# Patient Record
Sex: MALE | Race: WHITE | NOT HISPANIC OR LATINO | URBAN - METROPOLITAN AREA
[De-identification: names, ages, dates, MRNs, and addresses within clinical notes are randomized per-mention and may not be internally consistent; named-entity substitution may affect disease eponyms.]

---

## 2021-07-21 ENCOUNTER — HISTORICAL (OUTPATIENT)
Dept: ADMINISTRATIVE | Facility: HOSPITAL | Age: 21
End: 2021-07-21

## 2021-07-21 LAB
ABS NEUT (OLG): 4.9 X10(3)/MCL (ref 2.1–9.2)
ALBUMIN SERPL-MCNC: 4.6 GM/DL (ref 3.4–5)
ALBUMIN/GLOB SERPL: 1.7 {RATIO} (ref 1.5–2.5)
ALP SERPL-CCNC: 90 UNIT/L (ref 38–126)
ALT SERPL-CCNC: 76 UNIT/L (ref 7–52)
APPEARANCE, UA: CLEAR
AST SERPL-CCNC: 46 UNIT/L (ref 15–37)
BACTERIA #/AREA URNS AUTO: NORMAL /HPF
BILIRUB SERPL-MCNC: 0.5 MG/DL (ref 0.2–1)
BILIRUB UR QL STRIP: NEGATIVE MG/DL
BILIRUBIN DIRECT+TOT PNL SERPL-MCNC: 0.1 MG/DL (ref 0–0.5)
BILIRUBIN DIRECT+TOT PNL SERPL-MCNC: 0.4 MG/DL
BUN SERPL-MCNC: 10 MG/DL (ref 7–18)
CALCIUM SERPL-MCNC: 9.5 MG/DL (ref 8.5–10.1)
CHLORIDE SERPL-SCNC: 101 MMOL/L (ref 98–107)
CHOLEST SERPL-MCNC: 288 MG/DL (ref 0–200)
CHOLEST/HDLC SERPL: 7 {RATIO}
CO2 SERPL-SCNC: 28 MMOL/L (ref 21–32)
COLOR UR: YELLOW
CREAT SERPL-MCNC: 0.75 MG/DL (ref 0.6–1.3)
ERYTHROCYTE [DISTWIDTH] IN BLOOD BY AUTOMATED COUNT: 12.9 % (ref 11.5–17)
GLOBULIN SER-MCNC: 2.7 GM/DL (ref 1.2–3)
GLUCOSE (UA): NEGATIVE MG/DL
GLUCOSE SERPL-MCNC: 120 MG/DL (ref 74–106)
HCT VFR BLD AUTO: 50.3 % (ref 42–52)
HDLC SERPL-MCNC: 41 MG/DL (ref 35–60)
HGB BLD-MCNC: 16.5 GM/DL (ref 14–18)
HGB UR QL STRIP: NEGATIVE UNIT/L
KETONES UR QL STRIP: NEGATIVE MG/DL
LDLC SERPL CALC-MCNC: 212 MG/DL (ref 0–129)
LEUKOCYTE ESTERASE UR QL STRIP: NEGATIVE UNIT/L
LYMPHOCYTES # BLD AUTO: 3.3 X10(3)/MCL (ref 0.6–3.4)
LYMPHOCYTES NFR BLD AUTO: 36.7 % (ref 13–40)
MCH RBC QN AUTO: 29.5 PG (ref 27–31.2)
MCHC RBC AUTO-ENTMCNC: 33 GM/DL (ref 32–36)
MCV RBC AUTO: 90 FL (ref 80–94)
MONOCYTES # BLD AUTO: 0.8 X10(3)/MCL (ref 0.1–1.3)
MONOCYTES NFR BLD AUTO: 9.1 % (ref 0.1–24)
NEUTROPHILS NFR BLD AUTO: 54.2 % (ref 47–80)
NITRITE UR QL STRIP.AUTO: NEGATIVE
PH UR STRIP: 6 [PH]
PLATELET # BLD AUTO: 273 X10(3)/MCL (ref 130–400)
PMV BLD AUTO: 9.4 FL (ref 9.4–12.4)
POTASSIUM SERPL-SCNC: 4.3 MMOL/L (ref 3.5–5.1)
PROT SERPL-MCNC: 7.3 GM/DL (ref 6.4–8.2)
PROT UR QL STRIP: NEGATIVE MG/DL
RBC # BLD AUTO: 5.6 X10(6)/MCL (ref 4.7–6.1)
RBC #/AREA URNS HPF: NORMAL /HPF
SODIUM SERPL-SCNC: 141 MMOL/L (ref 136–145)
SP GR UR STRIP: 1.02
SQUAMOUS EPITHELIAL, UA: NORMAL /LPF
TRIGL SERPL-MCNC: 248 MG/DL (ref 30–150)
TSH SERPL-ACNC: 3.19 MIU/ML (ref 0.35–4.94)
UROBILINOGEN UR STRIP-ACNC: 0.2 MG/DL
VLDLC SERPL CALC-MCNC: 49.6 MG/DL
WBC # SPEC AUTO: 9 X10(3)/MCL (ref 4.5–11.5)
WBC #/AREA URNS AUTO: NORMAL /[HPF]

## 2021-07-26 LAB
EST. AVERAGE GLUCOSE BLD GHB EST-MCNC: 128 MG/DL
HBA1C MFR BLD: 6.1 % (ref 4.4–6.4)

## 2022-01-02 LAB
RAPID GROUP A STREP (OHS): NEGATIVE
SARS-COV-2 RNA RESP QL NAA+PROBE: NEGATIVE

## 2022-04-10 ENCOUNTER — HISTORICAL (OUTPATIENT)
Dept: ADMINISTRATIVE | Facility: HOSPITAL | Age: 22
End: 2022-04-10
Payer: COMMERCIAL

## 2022-04-25 ENCOUNTER — OFFICE VISIT (OUTPATIENT)
Dept: DERMATOLOGY | Facility: CLINIC | Age: 22
End: 2022-04-25
Payer: COMMERCIAL

## 2022-04-25 ENCOUNTER — PATIENT MESSAGE (OUTPATIENT)
Dept: DERMATOLOGY | Facility: CLINIC | Age: 22
End: 2022-04-25

## 2022-04-25 DIAGNOSIS — L08.9 SUPERFICIAL SKIN INFECTION: Primary | ICD-10-CM

## 2022-04-25 PROCEDURE — 99204 OFFICE O/P NEW MOD 45 MIN: CPT | Mod: 95,,, | Performed by: DERMATOLOGY

## 2022-04-25 PROCEDURE — 99204 PR OFFICE/OUTPT VISIT, NEW, LEVL IV, 45-59 MIN: ICD-10-PCS | Mod: 95,,, | Performed by: DERMATOLOGY

## 2022-04-25 RX ORDER — MUPIROCIN 20 MG/G
OINTMENT TOPICAL 2 TIMES DAILY
Qty: 30 G | Refills: 11 | Status: SHIPPED | OUTPATIENT
Start: 2022-04-25 | End: 2022-07-05 | Stop reason: SDUPTHER

## 2022-04-25 RX ORDER — RIFAMPIN 300 MG/1
300 CAPSULE ORAL EVERY 12 HOURS
Qty: 60 CAPSULE | Refills: 2 | Status: SHIPPED | OUTPATIENT
Start: 2022-04-25 | End: 2022-07-05 | Stop reason: SDUPTHER

## 2022-04-25 RX ORDER — DOXYCYCLINE 100 MG/1
TABLET ORAL
Qty: 60 TABLET | Refills: 2 | Status: SHIPPED | OUTPATIENT
Start: 2022-04-25 | End: 2022-06-09

## 2022-04-25 NOTE — PATIENT INSTRUCTIONS
Discussed benefits and risks of doxycyline therapy including but not limited to GI discomfort, esophageal irritation/ulceration, and increased sun sensitivity. Patient was counseled to take medicine with meals and at least 1 hour before lying down.     Counseled patient that rifampin may turn bodily fluids red-orange including urine, saliva, nasal discharge, tears. It may turn contacts red yellow it may also cause birth control to be ineffective,  advised patient to use backup form contraception if necessary    Start muprocin BID to lesions of concern, once done with antibiotics start twice daily to nares (take cotton swab and push around nares0    Start bleach baths:  Take 1/3 cup of bleach to a 2 inch tub of water let patient sit and play for 15 minutes 2-3 x a week.     If lesions spread, are unbearbly painful or if you have fever please go to the ER to seek urgent care as there may be a need for IV antibiotics    If lesions are not improving or there are more within this next month, patient is to message me and we will see him sooner than 3 months, otherwise patient is to take 3 month course of therapy until lesions are clear (whichever happens sooner)    F/u 3 mo

## 2022-04-25 NOTE — PROGRESS NOTES
Subjective:       Patient ID:  Anthony Lares is a 22 y.o. male who presents for   Chief Complaint   Patient presents with    Infection     History of Present Illness: The patient presents with chief complaint of  lesions.  Location: everywhere  Duration: 5 months  Signs/Symptoms: scabs that welp up    Prior treatments: abx x 10 days without relief      Review of Systems   HENT: Negative for nosebleeds and headaches.    Gastrointestinal: Negative for diarrhea and Sensitivity to oral antibiotics.   Musculoskeletal: Negative for arthralgias.   Skin: Positive for activity-related sunscreen use. Negative for daily sunscreen use and recent sunburn.   Neurological: Negative for headaches.   Psychiatric/Behavioral: Negative for depressed mood.        Objective:    Physical Exam   Constitutional: He appears well-developed and well-nourished. No distress.   Neurological: He is alert and oriented to person, place, and time. He is not disoriented.   Psychiatric: He has a normal mood and affect.   Skin:   Areas Examined (abnormalities noted in diagram):   Back Inspection Performed              Diagram Legend     Erythematous scaling macule/papule c/w actinic keratosis       Vascular papule c/w angioma      Pigmented verrucoid papule/plaque c/w seborrheic keratosis      Yellow umbilicated papule c/w sebaceous hyperplasia      Irregularly shaped tan macule c/w lentigo     1-2 mm smooth white papules consistent with Milia      Movable subcutaneous cyst with punctum c/w epidermal inclusion cyst      Subcutaneous movable cyst c/w pilar cyst      Firm pink to brown papule c/w dermatofibroma      Pedunculated fleshy papule(s) c/w skin tag(s)      Evenly pigmented macule c/w junctional nevus     Mildly variegated pigmented, slightly irregular-bordered macule c/w mildly atypical nevus      Flesh colored to evenly pigmented papule c/w intradermal nevus       Pink pearly papule/plaque c/w basal cell carcinoma      Erythematous  hyperkeratotic cursted plaque c/w SCC      Surgical scar with no sign of skin cancer recurrence      Open and closed comedones      Inflammatory papules and pustules      Verrucoid papule consistent consistent with wart     Erythematous eczematous patches and plaques     Dystrophic onycholytic nail with subungual debris c/w onychomycosis     Umbilicated papule    Erythematous-base heme-crusted tan verrucoid plaque consistent with inflamed seborrheic keratosis     Erythematous Silvery Scaling Plaque c/w Psoriasis     See annotation      Assessment / Plan:        Superficial skin infection  -     rifAMpin (RIFADIN) 300 MG capsule; Take 1 capsule (300 mg total) by mouth every 12 (twelve) hours.  Dispense: 60 capsule; Refill: 2  -     doxycycline monohydrate 100 mg Tab; Take 2 po qday  Dispense: 60 tablet; Refill: 2  -     mupirocin (BACTROBAN) 2 % ointment; Apply topically 2 (two) times daily. Once done with month supply of antibiotics  Dispense: 30 g; Refill: 11    Discussed benefits and risks of doxycyline therapy including but not limited to GI discomfort, esophageal irritation/ulceration, and increased sun sensitivity. Patient was counseled to take medicine with meals and at least 1 hour before lying down.     Counseled patient that rifampin may turn bodily fluids red-orange including urine, saliva, nasal discharge, tears. It may turn contacts red yellow it may also cause birth control to be ineffective,  advised patient to use backup form contraception if necessary    Start muprocin BID to lesions of concern, once done with antibiotics start twice daily to nares (take cotton swab and push around nares0    Start bleach baths:  Take 1/3 cup of bleach to a 2 inch tub of water let patient sit and play for 15 minutes 2-3 x a week.     If lesions spread, are unbearbly painful or if you have fever please go to the ER to seek urgent care as there may be a need for IV antibiotics    If lesions are not improving or there  are more within this next month, patient is to message me and we will see him sooner than 3 months, otherwise patient is to take 3 month course of therapy until lesions are clear (whichever happens sooner)    F/u 3 mo      The patient location is: Home in [place]  The chief complaint leading to consultation is: lesions  Visit type: audiovisual    Face to Face time with patient: 20  25 minutes of total time spent on the encounter, which includes face to face time and non-face to face time preparing to see the patient (eg, review of tests), Obtaining and/or reviewing separately obtained history, Documenting clinical information in the electronic or other health record, Independently interpreting results (not separately reported) and communicating results to the patient/family/caregiver, or Care coordination (not separately reported).         Each patient to whom he or she provides medical services by telemedicine is:  (1) informed of the relationship between the physician and patient and the respective role of any other health care provider with respect to management of the patient; and (2) notified that he or she may decline to receive medical services by telemedicine and may withdraw from such care at any time.               No follow-ups on file.

## 2022-04-28 VITALS
DIASTOLIC BLOOD PRESSURE: 92 MMHG | OXYGEN SATURATION: 99 % | BODY MASS INDEX: 33.34 KG/M2 | SYSTOLIC BLOOD PRESSURE: 133 MMHG | WEIGHT: 238.13 LBS | HEIGHT: 71 IN

## 2022-05-03 ENCOUNTER — PATIENT MESSAGE (OUTPATIENT)
Dept: DERMATOLOGY | Facility: CLINIC | Age: 22
End: 2022-05-03
Payer: COMMERCIAL

## 2022-05-04 DIAGNOSIS — Z79.899 ENCOUNTER FOR LONG-TERM (CURRENT) USE OF OTHER MEDICATIONS: Primary | ICD-10-CM

## 2022-05-04 RX ORDER — CLINDAMYCIN HYDROCHLORIDE 150 MG/1
300 CAPSULE ORAL EVERY 12 HOURS
Qty: 120 CAPSULE | Refills: 2 | Status: SHIPPED | OUTPATIENT
Start: 2022-05-04 | End: 2022-07-05 | Stop reason: SDUPTHER

## 2022-05-04 NOTE — TELEPHONE ENCOUNTER
----- Message from Carine Burton RN sent at 5/4/2022  3:48 PM CDT -----  Regarding: FW: Pr inquiry    ----- Message -----  From: Avtar Spence MA  Sent: 5/3/2022   4:46 PM CDT  To: Carine Burton RN  Subject: FW: Pr inquiry                                     ----- Message -----  From: Dilia Fung  Sent: 5/3/2022   4:34 PM CDT  To: [facility] Staff  Subject: Pr inquiry                                       Pt  calling speak with staff regarding medication  [phone] (home)

## 2022-05-09 ENCOUNTER — OFFICE VISIT (OUTPATIENT)
Dept: PSYCHIATRY | Facility: CLINIC | Age: 22
End: 2022-05-09
Payer: COMMERCIAL

## 2022-05-09 ENCOUNTER — PATIENT MESSAGE (OUTPATIENT)
Dept: DERMATOLOGY | Facility: CLINIC | Age: 22
End: 2022-05-09
Payer: COMMERCIAL

## 2022-05-09 DIAGNOSIS — F33.2 SEVERE EPISODE OF RECURRENT MAJOR DEPRESSIVE DISORDER, WITHOUT PSYCHOTIC FEATURES: Primary | ICD-10-CM

## 2022-05-09 DIAGNOSIS — F41.1 GAD (GENERALIZED ANXIETY DISORDER): ICD-10-CM

## 2022-05-09 PROCEDURE — 90791 PSYCH DIAGNOSTIC EVALUATION: CPT | Mod: 95,,, | Performed by: PSYCHOLOGIST

## 2022-05-09 PROCEDURE — 90791 PR PSYCHIATRIC DIAGNOSTIC EVALUATION: ICD-10-PCS | Mod: 95,,, | Performed by: PSYCHOLOGIST

## 2022-05-09 NOTE — PROGRESS NOTES
"Psychiatry Initial Visit (PhD/LCSW)  Diagnostic Interview - CPT 51791    Date: 5/9/2022    Site: Telemed     The patient location is: Patient's home/ Patient reported that his location at the time of this visit was in the [facility]     Visit type: Virtual visit with synchronous audio and video     Each patient to whom he or she provides medical services by telemedicine is: (1) informed of the relationship between the physician and patient and the respective role of any other health care provider with respect to management of the patient; and (2) notified that he or she may decline to receive medical services by telemedicine and may withdraw from such care at any time.    Referral source: self    Clinical status of patient: Outpatient    Anthony Lares, a 22 y.o. male, for initial evaluation visit.  Met with patient.    Chief complaint/reason for encounter: depression and anxiety    History of present illness: Patient reported long history of anxiety and depression since adolescence.  Symptoms include feeling overwhelmed, depressed mood, diminished interest, insomnia, fatigue, worthlessness/guilt, poor concentration, thoughts of death, tearfulness, social isolation, decreased memory, excessive anxiety/worry, restlessness/keyed up, irritability, muscle tension and panic attacks.  Patient noted that there has been times where he has been depressed to the point that he does not want to get out of bed.  Patient reported recent stressors including the end of his four year relationship and relocation to [place] in August 2021.  Patient reported transient thoughts of suicide or "nonexistence," but he denied plan or intent.  Patient denied access to a firearm.  Patient denied history of suicide attempts and self-harm behaviors.  Patient denied current suicidal and homicidal ideations.     Pain: noncontributory    Symptoms:   · Mood: depressed mood, diminished interest, insomnia, fatigue, worthlessness/guilt, " "poor concentration, thoughts of death, tearfulness and social isolation  · Anxiety: decreased memory, excessive anxiety/worry, restlessness/keyed up, irritability, muscle tension and panic attacks  · Substance abuse: denied  · Cognitive functioning: denied  · Health behaviors: noncontributory    Psychiatric history: has participated in counseling/psychotherapy on an outpatient basis in the past - three times - high school, twice in [facility] - via [facility] - "did not find very helpful due to forced use of CBT by residents in training"     Medical history: noncontributory - elevated cholesterol, prolonged staph infection    Family history of psychiatric illness: Mother - anxiety and depression, Sister - depression    Social history (marriage, employment, etc.): Patient reported growing up in [place], [place] living with his parents and sisters.  He is the oldest of three children.  He reports having good relationships with his younger twin sisters.  He stated that he tends to have a "parent child relationship" with his sisters.  He noted that his mother was recovering from surgery for two years when he was in high school so he had to help with caring for his sisters.  Patient reports having close relationships with both of his parents.  Patient denied history of abuse or trauma during childhood.  He graduated from high school.  His highest level of education is three Bachelor's degrees in Political Science, Economics, and Math.  He is currently in a Ph.D. program at [employer] majoring in Economics.  He is a full time student and also works as a .  He has never been  and has no children.  He reported that his last relationship of four years ended nine months ago "because their lives were headed in different directions" and they were relocating to different places.        Substance use:   Alcohol: occasional   Drugs: Marijuana - "a few hits" nightly, Psychedelics - abstinent for " about six months   Tobacco: none   Caffeine: Sodas - occasional    Current medications and drug reactions (include OTC, herbal): see medication list     Strengths and liabilities: Strength: Patient is expressive/articulate., Liability: Patient lacks coping skills.    Current Evaluation:     Mental Status Exam:  General Appearance:  unremarkable, age appropriate   Speech: normal tone, normal rate, normal pitch, normal volume      Level of Cooperation: cooperative      Thought Processes: normal and logical   Mood: anxious, depressed      Thought Content: normal, no suicidality, no homicidality, delusions, or paranoia   Affect: congruent and appropriate   Orientation: Oriented x3   Memory: recent >  words after brief delay: 3 of 3 words, remote >  intact   Attention Span & Concentration: completed serial 7s adequately   Fund of General Knowledge: intact and appropriate to age and level of education   Judgment & Insight: fair     Language  intact     Diagnostic Impression - Plan:       ICD-10-CM ICD-9-CM   1. Severe episode of recurrent major depressive disorder, without psychotic features  F33.2 296.33   2. STEPHANE (generalized anxiety disorder)  F41.1 300.02       Plan:individual psychotherapy and consult psychiatrist for medication evaluation    Return to Clinic: 2 weeks    Length of Service (minutes): 45

## 2022-05-17 ENCOUNTER — OFFICE VISIT (OUTPATIENT)
Dept: PSYCHIATRY | Facility: CLINIC | Age: 22
End: 2022-05-17
Payer: COMMERCIAL

## 2022-05-17 DIAGNOSIS — F33.2 SEVERE EPISODE OF RECURRENT MAJOR DEPRESSIVE DISORDER, WITHOUT PSYCHOTIC FEATURES: Primary | ICD-10-CM

## 2022-05-17 DIAGNOSIS — F41.1 GAD (GENERALIZED ANXIETY DISORDER): ICD-10-CM

## 2022-05-17 PROCEDURE — 90837 PSYTX W PT 60 MINUTES: CPT | Mod: 95,,, | Performed by: PSYCHOLOGIST

## 2022-05-17 PROCEDURE — 90837 PR PSYCHOTHERAPY W/PATIENT, 60 MIN: ICD-10-PCS | Mod: 95,,, | Performed by: PSYCHOLOGIST

## 2022-05-17 NOTE — PROGRESS NOTES
Individual Psychotherapy (PhD/LCSW)    5/17/2022    Therapeutic Intervention: Met with patient.  Outpatient - Behavior modifying psychotherapy 60 min - CPT code 42808    The patient location is: Patient's home/ Patient reported that his location at the time of this visit was in the [facility]     Visit type: Virtual visit with synchronous audio and video     Each patient to whom he or she provides medical services by telemedicine is: (1) informed of the relationship between the physician and patient and the respective role of any other health care provider with respect to management of the patient; and (2) notified that he or she may decline to receive medical services by telemedicine and may withdraw from such care at any time.    Chief complaint/reason for encounter: depression and anxiety     Interval history and content of current session: Patient presented casually dressed, alert, and oriented.  Patient reported experiencing feeling overwhelmed, depressed mood, diminished interest, insomnia, fatigue, worthlessness/guilt, poor concentration, thoughts of death, tearfulness, social isolation, decreased memory, excessive anxiety/worry, restlessness/keyed up, irritability, muscle tension and panic attacks.  Patient reported transient thoughts of suicide in the past, but he denied plan or intent.  Patient denied current suicidal and homicidal ideations.  Discussed a safety plan with patient when he is experiencing suicidal or self harm thoughts, including patient going to his local emergency room, calling 911, or contacting the National Suicide Prevention hotline (1-744.927.5791).  Explored source of patient's depressed mood.  Patient discussed that he tends to become so depressed that he has little energy to do activities of daily life which makes him feel even more depressed.  Active listening skills were used as patient described recent stressors including school, limited local social support, ending of a  friendship, and concerns about COVID-19.  Patient was taught behavioral coping strategies such as physical exercise, increased social involvement, sharing of feelings, setting boundaries, and increased assertiveness as ways to reduce feelings of anxiety and depression.                             Treatment plan:  · Target symptoms: depression, anxiety   · Why chosen therapy is appropriate versus another modality: relevant to diagnosis  · Outcome monitoring methods: self-report  · Therapeutic intervention type: insight oriented psychotherapy, behavior modifying psychotherapy    Risk parameters:  Patient reports no suicidal ideation  Patient reports no homicidal ideation  Patient reports no self-injurious behavior  Patient reports no violent behavior    Verbal deficits: None    Patient's response to intervention:  The patient's response to intervention is accepting.    Progress toward goals and other mental status changes:  The patient's progress toward goals is fair .    Diagnosis:     ICD-10-CM ICD-9-CM   1. Severe episode of recurrent major depressive disorder, without psychotic features  F33.2 296.33   2. STEPHANE (generalized anxiety disorder)  F41.1 300.02       Plan:  individual psychotherapy and consult psychiatrist for medication evaluation - wants to wait on medication until after his prelims are completed.    Return to clinic: 2 weeks    Length of Service (minutes): 60

## 2022-05-23 ENCOUNTER — OFFICE VISIT (OUTPATIENT)
Dept: PSYCHIATRY | Facility: CLINIC | Age: 22
End: 2022-05-23
Payer: COMMERCIAL

## 2022-05-23 DIAGNOSIS — F41.1 GAD (GENERALIZED ANXIETY DISORDER): ICD-10-CM

## 2022-05-23 DIAGNOSIS — F33.2 SEVERE EPISODE OF RECURRENT MAJOR DEPRESSIVE DISORDER, WITHOUT PSYCHOTIC FEATURES: Primary | ICD-10-CM

## 2022-05-23 PROCEDURE — 90837 PSYTX W PT 60 MINUTES: CPT | Mod: 95,,, | Performed by: PSYCHOLOGIST

## 2022-05-23 PROCEDURE — 90837 PR PSYCHOTHERAPY W/PATIENT, 60 MIN: ICD-10-PCS | Mod: 95,,, | Performed by: PSYCHOLOGIST

## 2022-05-23 NOTE — PROGRESS NOTES
Individual Psychotherapy (PhD/LCSW)    5/23/2022    Therapeutic Intervention: Met with patient.  Outpatient - Behavior modifying psychotherapy 60 min - CPT code 22351    The patient location is: Patient's home/ Patient reported that his location at the time of this visit was in the [facility]     Visit type: Virtual visit with synchronous audio and video     Each patient to whom he or she provides medical services by telemedicine is: (1) informed of the relationship between the physician and patient and the respective role of any other health care provider with respect to management of the patient; and (2) notified that he or she may decline to receive medical services by telemedicine and may withdraw from such care at any time.    Chief complaint/reason for encounter: depression and anxiety     Interval history and content of current session: Patient presented casually dressed, alert, and oriented.  Patient reported experiencing feeling overwhelmed, depressed mood, diminished interest, insomnia, fatigue, worthlessness/guilt, poor concentration, thoughts of death, tearfulness, social isolation, decreased memory, excessive anxiety/worry, restlessness/keyed up, irritability, muscle tension and panic attacks.  Patient denied current suicidal and homicidal ideations.  Explored source of patient's depressed mood.  Patient discussed his difficulty establishing a local social support system given his COVID concerns.  Explored ways for patient to make friends locally.  Active listening skills were used as patient described feeling unmotivated and exhibiting poor executive functioning skills in light of his upcoming preliminary examinations.  Patient was taught behavioral coping strategies such as physical exercise, increased social involvement, relaxation skills, and activity scheduling as ways to reduce feelings of anxiety and depression.                         Treatment plan:  · Target symptoms: depression,  anxiety   · Why chosen therapy is appropriate versus another modality: relevant to diagnosis  · Outcome monitoring methods: self-report  · Therapeutic intervention type: insight oriented psychotherapy, behavior modifying psychotherapy    Risk parameters:  Patient reports no suicidal ideation  Patient reports no homicidal ideation  Patient reports no self-injurious behavior  Patient reports no violent behavior    Verbal deficits: None    Patient's response to intervention:  The patient's response to intervention is accepting.    Progress toward goals and other mental status changes:  The patient's progress toward goals is fair .    Diagnosis:     ICD-10-CM ICD-9-CM   1. Severe episode of recurrent major depressive disorder, without psychotic features  F33.2 296.33   2. STEPHANE (generalized anxiety disorder)  F41.1 300.02       Plan:  individual psychotherapy and consult psychiatrist for medication evaluation - wants to wait on medication until after his prelims are completed.    Return to clinic: 2 weeks    Length of Service (minutes): 60

## 2022-05-30 ENCOUNTER — PATIENT MESSAGE (OUTPATIENT)
Dept: DERMATOLOGY | Facility: CLINIC | Age: 22
End: 2022-05-30
Payer: COMMERCIAL

## 2022-06-02 ENCOUNTER — OFFICE VISIT (OUTPATIENT)
Dept: PSYCHIATRY | Facility: CLINIC | Age: 22
End: 2022-06-02
Payer: COMMERCIAL

## 2022-06-02 DIAGNOSIS — F33.2 SEVERE EPISODE OF RECURRENT MAJOR DEPRESSIVE DISORDER, WITHOUT PSYCHOTIC FEATURES: Primary | ICD-10-CM

## 2022-06-02 DIAGNOSIS — F41.1 GAD (GENERALIZED ANXIETY DISORDER): ICD-10-CM

## 2022-06-02 PROCEDURE — 90837 PR PSYCHOTHERAPY W/PATIENT, 60 MIN: ICD-10-PCS | Mod: 95,,, | Performed by: PSYCHOLOGIST

## 2022-06-02 PROCEDURE — 90837 PSYTX W PT 60 MINUTES: CPT | Mod: 95,,, | Performed by: PSYCHOLOGIST

## 2022-06-02 NOTE — PROGRESS NOTES
Individual Psychotherapy (PhD/LCSW)    6/2/2022    Therapeutic Intervention: Met with patient.  Outpatient - Behavior modifying psychotherapy 60 min - CPT code 28314    The patient location is: Patient's home/ Patient reported that his location at the time of this visit was in the [facility]     Visit type: Virtual visit with synchronous audio and video     Each patient to whom he or she provides medical services by telemedicine is: (1) informed of the relationship between the physician and patient and the respective role of any other health care provider with respect to management of the patient; and (2) notified that he or she may decline to receive medical services by telemedicine and may withdraw from such care at any time.    Chief complaint/reason for encounter: depression and anxiety     Interval history and content of current session: Patient presented casually dressed, alert, and oriented.  Patient reported experiencing feeling overwhelmed, depressed mood, diminished interest, insomnia, fatigue, worthlessness/guilt, poor concentration, thoughts of death, tearfulness, social isolation, decreased memory, excessive anxiety/worry, restlessness/keyed up, irritability, muscle tension and panic attacks.  Patient denied current suicidal and homicidal ideations.  Explored source of patient's depressed mood.  Patient discussed his difficulty establishing a local social support system as he does not relate to his classmates.  Active listening skills were used as patient described experiencing diminished interest in activities that he previously enjoyed including his tendency to want to care for others.  Patient exhibited good insight when he stated that he tends to keep himself busy to avoid feeling his emotions.  Discussed patient participating in the [facility] clinic to address his depressed mood and patient agreed to the program.                    Treatment plan:  · Target symptoms: depression, anxiety   · Why  chosen therapy is appropriate versus another modality: relevant to diagnosis  · Outcome monitoring methods: self-report  · Therapeutic intervention type: insight oriented psychotherapy, behavior modifying psychotherapy    Risk parameters:  Patient reports no suicidal ideation  Patient reports no homicidal ideation  Patient reports no self-injurious behavior  Patient reports no violent behavior    Verbal deficits: None    Patient's response to intervention:  The patient's response to intervention is accepting.    Progress toward goals and other mental status changes:  The patient's progress toward goals is fair .    Diagnosis:     ICD-10-CM ICD-9-CM   1. Severe episode of recurrent major depressive disorder, without psychotic features  F33.2 296.33   2. STEPHANE (generalized anxiety disorder)  F41.1 300.02       Plan:  individual psychotherapy and consult psychiatrist for medication evaluation - wants to wait on medication until after his prelims are completed.    Return to clinic: 2 weeks    Length of Service (minutes): 60

## 2022-06-06 ENCOUNTER — PATIENT MESSAGE (OUTPATIENT)
Dept: PSYCHIATRY | Facility: CLINIC | Age: 22
End: 2022-06-06
Payer: COMMERCIAL

## 2022-06-07 ENCOUNTER — PATIENT MESSAGE (OUTPATIENT)
Dept: PSYCHIATRY | Facility: CLINIC | Age: 22
End: 2022-06-07
Payer: COMMERCIAL

## 2022-06-07 ENCOUNTER — OFFICE VISIT (OUTPATIENT)
Dept: PSYCHIATRY | Facility: CLINIC | Age: 22
End: 2022-06-07
Payer: COMMERCIAL

## 2022-06-07 DIAGNOSIS — F41.1 GAD (GENERALIZED ANXIETY DISORDER): ICD-10-CM

## 2022-06-07 DIAGNOSIS — F33.2 SEVERE EPISODE OF RECURRENT MAJOR DEPRESSIVE DISORDER, WITHOUT PSYCHOTIC FEATURES: Primary | ICD-10-CM

## 2022-06-07 PROCEDURE — 90837 PSYTX W PT 60 MINUTES: CPT | Mod: 95,,, | Performed by: PSYCHOLOGIST

## 2022-06-07 PROCEDURE — 90837 PR PSYCHOTHERAPY W/PATIENT, 60 MIN: ICD-10-PCS | Mod: 95,,, | Performed by: PSYCHOLOGIST

## 2022-06-07 NOTE — PROGRESS NOTES
Individual Psychotherapy (PhD/LCSW)    6/7/2022    Therapeutic Intervention: Met with patient.  Outpatient - Behavior modifying psychotherapy 60 min - CPT code 72613    The patient location is: Patient's home/ Patient reported that his location at the time of this visit was in the [facility]     Visit type: Virtual visit with synchronous audio and video     Each patient to whom he or she provides medical services by telemedicine is: (1) informed of the relationship between the physician and patient and the respective role of any other health care provider with respect to management of the patient; and (2) notified that he or she may decline to receive medical services by telemedicine and may withdraw from such care at any time.    Chief complaint/reason for encounter: depression and anxiety     Interval history and content of current session: Patient presented casually dressed, alert, and oriented.  Patient reported experiencing feeling overwhelmed, depressed mood, diminished interest, insomnia, fatigue, worthlessness/guilt, poor concentration, thoughts of death, tearfulness, social isolation, decreased memory, excessive anxiety/worry, restlessness/keyed up, irritability, muscle tension and panic attacks.  Patient denied current suicidal and homicidal ideations.  Explored source of patient's depressed mood.  Patient discussed his lack of motivation for studying for his preliminary examinations yet overwhelmed by this exam being one of the most important ones for his academic program.  Active listening skills were used as patient described his uncertainty around how his current academic track may not match some of his future career goals.  Assisted patient in exploring his career goals and what he desires to feel fulfilled in his field.  Patient exhibited good insight when he stated that he feels pressure to be the leader in his family as one of the few individuals to graduate college.  He also discussed last  feeling happy last summer while on a camping trip with his partner after patient's graduation.                   Treatment plan:  · Target symptoms: depression, anxiety   · Why chosen therapy is appropriate versus another modality: relevant to diagnosis  · Outcome monitoring methods: self-report  · Therapeutic intervention type: insight oriented psychotherapy, behavior modifying psychotherapy    Risk parameters:  Patient reports no suicidal ideation  Patient reports no homicidal ideation  Patient reports no self-injurious behavior  Patient reports no violent behavior    Verbal deficits: None    Patient's response to intervention:  The patient's response to intervention is accepting.    Progress toward goals and other mental status changes:  The patient's progress toward goals is fair .    Diagnosis:     ICD-10-CM ICD-9-CM   1. Severe episode of recurrent major depressive disorder, without psychotic features  F33.2 296.33   2. STEPHANE (generalized anxiety disorder)  F41.1 300.02       Plan:  individual psychotherapy and consult psychiatrist for medication evaluation - wants to wait on medication until after his prelims are completed.    Return to clinic: 2 weeks    Length of Service (minutes): 60

## 2022-06-08 ENCOUNTER — TELEPHONE (OUTPATIENT)
Dept: OPTOMETRY | Facility: CLINIC | Age: 22
End: 2022-06-08
Payer: COMMERCIAL

## 2022-06-08 NOTE — TELEPHONE ENCOUNTER
----- Message from Isa Sousa sent at 6/8/2022  3:33 PM CDT -----  Regarding: Pt Inquiry  Pt would like to know if we offer Vision Therapy. Pts call back is:  [phone]

## 2022-06-09 ENCOUNTER — PATIENT MESSAGE (OUTPATIENT)
Dept: PSYCHIATRY | Facility: CLINIC | Age: 22
End: 2022-06-09
Payer: COMMERCIAL

## 2022-06-09 ENCOUNTER — OFFICE VISIT (OUTPATIENT)
Dept: PSYCHIATRY | Facility: CLINIC | Age: 22
End: 2022-06-09
Payer: COMMERCIAL

## 2022-06-09 DIAGNOSIS — F33.2 SEVERE EPISODE OF RECURRENT MAJOR DEPRESSIVE DISORDER, WITHOUT PSYCHOTIC FEATURES: Primary | ICD-10-CM

## 2022-06-09 PROCEDURE — 1159F PR MEDICATION LIST DOCUMENTED IN MEDICAL RECORD: ICD-10-PCS | Mod: CPTII,95,, | Performed by: NURSE PRACTITIONER

## 2022-06-09 PROCEDURE — 1160F RVW MEDS BY RX/DR IN RCRD: CPT | Mod: CPTII,95,, | Performed by: NURSE PRACTITIONER

## 2022-06-09 PROCEDURE — 90792 PR PSYCHIATRIC DIAGNOSTIC EVALUATION W/MEDICAL SERVICES: ICD-10-PCS | Mod: 95,,, | Performed by: NURSE PRACTITIONER

## 2022-06-09 PROCEDURE — 1159F MED LIST DOCD IN RCRD: CPT | Mod: CPTII,95,, | Performed by: NURSE PRACTITIONER

## 2022-06-09 PROCEDURE — 90792 PSYCH DIAG EVAL W/MED SRVCS: CPT | Mod: 95,,, | Performed by: NURSE PRACTITIONER

## 2022-06-09 PROCEDURE — 1160F PR REVIEW ALL MEDS BY PRESCRIBER/CLIN PHARMACIST DOCUMENTED: ICD-10-PCS | Mod: CPTII,95,, | Performed by: NURSE PRACTITIONER

## 2022-06-09 RX ORDER — VENLAFAXINE HYDROCHLORIDE 37.5 MG/1
37.5 CAPSULE, EXTENDED RELEASE ORAL DAILY
Qty: 7 CAPSULE | Refills: 0 | Status: SHIPPED | OUTPATIENT
Start: 2022-06-09 | End: 2022-06-21 | Stop reason: SDUPTHER

## 2022-06-09 RX ORDER — VENLAFAXINE HYDROCHLORIDE 75 MG/1
75 CAPSULE, EXTENDED RELEASE ORAL DAILY
Qty: 30 CAPSULE | Refills: 2 | Status: SHIPPED | OUTPATIENT
Start: 2022-06-09 | End: 2022-06-21 | Stop reason: SDUPTHER
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
## 2022-06-13 ENCOUNTER — PATIENT MESSAGE (OUTPATIENT)
Dept: PSYCHIATRY | Facility: CLINIC | Age: 22
End: 2022-06-13
Payer: COMMERCIAL

## 2022-06-13 ENCOUNTER — OFFICE VISIT (OUTPATIENT)
Dept: PSYCHIATRY | Facility: CLINIC | Age: 22
End: 2022-06-13
Payer: COMMERCIAL

## 2022-06-13 ENCOUNTER — TELEPHONE (OUTPATIENT)
Dept: PSYCHIATRY | Facility: CLINIC | Age: 22
End: 2022-06-13
Payer: COMMERCIAL

## 2022-06-13 DIAGNOSIS — F41.1 GAD (GENERALIZED ANXIETY DISORDER): ICD-10-CM

## 2022-06-13 DIAGNOSIS — F33.2 SEVERE EPISODE OF RECURRENT MAJOR DEPRESSIVE DISORDER, WITHOUT PSYCHOTIC FEATURES: Primary | ICD-10-CM

## 2022-06-13 PROCEDURE — 90837 PSYTX W PT 60 MINUTES: CPT | Mod: 95,,, | Performed by: PSYCHOLOGIST

## 2022-06-13 PROCEDURE — 90837 PR PSYCHOTHERAPY W/PATIENT, 60 MIN: ICD-10-PCS | Mod: 95,,, | Performed by: PSYCHOLOGIST

## 2022-06-13 NOTE — TELEPHONE ENCOUNTER
Pharmacogenetics is a coverage exclusion - can fill out PA form on [employer] however today they are reportedly having issues on this website. Fax number provided as an alternative route for authorization process.     [phone]

## 2022-06-14 ENCOUNTER — TELEPHONE (OUTPATIENT)
Dept: OPTOMETRY | Facility: CLINIC | Age: 22
End: 2022-06-14
Payer: COMMERCIAL

## 2022-06-14 NOTE — TELEPHONE ENCOUNTER
Called pt after he had called to schedule an appointment for vision therapy as a reaction to a referral from Dr Nava Quijano ( [place] [facility]). Dr Quijano concluded that Pt has diplopia due to intermittent eye alignment problem ( horizontal) due to muscular problems and referred to Dr Tatyana Jean for possible eye therapy. Insurance does not cover Dr Lewis therapy so Pt tries to see if there is another [facility] provider who can possible help with his current vision problems. I explained that we will need the referral and possibly the lasts eye exam notes to decide which provider will be the best fit for him.  He reports that he had diplopia since he was a child The last eye exams before Dr Quijano his complaints were dismissed. He states that he was told that one of his eye turns out when he is tired and diplopia gets worse when tired. Dilpopia is gone when one eye is covered and is worse in greater distance than 5feet. It seems recently that the diplopia is progressively getting worse. Agreed to call back as soon as we receive the paperwork or call back if we have not received in the next 24hours

## 2022-06-14 NOTE — PROGRESS NOTES
Individual Psychotherapy (PhD/LCSW)    6/13/2022    Therapeutic Intervention: Met with patient.  Outpatient - Behavior modifying psychotherapy 60 min - CPT code 13391    The patient location is: Patient's home/ Patient reported that his location at the time of this visit was in the [facility]     Visit type: Virtual visit with synchronous audio and video     Each patient to whom he or she provides medical services by telemedicine is: (1) informed of the relationship between the physician and patient and the respective role of any other health care provider with respect to management of the patient; and (2) notified that he or she may decline to receive medical services by telemedicine and may withdraw from such care at any time.    Chief complaint/reason for encounter: depression and anxiety     Interval history and content of current session: Patient presented casually dressed, alert, and oriented.  Patient reported experiencing feeling overwhelmed, depressed mood, diminished interest, insomnia, fatigue, worthlessness/guilt, poor concentration, thoughts of death, tearfulness, social isolation, decreased memory, excessive anxiety/worry, restlessness/keyed up, irritability, muscle tension and panic attacks.  Patient denied current suicidal and homicidal ideations.  Explored source of patient's depressed mood.  Patient discussed recently starting his preliminary examinations in which he feels confident.  Active listening skills were used as patient described the events leading up to and after the ending of his relationship of about five years.  Assisted patient in processing his feelings around the relationship.  Patient exhibited good insight when he stated that he feels unmotivated and unfulfilled because he enjoys helping others which he is not currently doing.  Discussed patient's tendency to remain busy to avoid his feelings.  Discussed patient starting the BEBP program and resuming therapy with current  provider once he has completed the program or as needed.                 Treatment plan:  · Target symptoms: depression, anxiety   · Why chosen therapy is appropriate versus another modality: relevant to diagnosis  · Outcome monitoring methods: self-report  · Therapeutic intervention type: insight oriented psychotherapy, behavior modifying psychotherapy    Risk parameters:  Patient reports no suicidal ideation  Patient reports no homicidal ideation  Patient reports no self-injurious behavior  Patient reports no violent behavior    Verbal deficits: None    Patient's response to intervention:  The patient's response to intervention is accepting.    Progress toward goals and other mental status changes:  The patient's progress toward goals is fair .    Diagnosis:     ICD-10-CM ICD-9-CM   1. Severe episode of recurrent major depressive disorder, without psychotic features  F33.2 296.33   2. STEPHANE (generalized anxiety disorder)  F41.1 300.02       Plan:  individual psychotherapy and consult psychiatrist for medication evaluation - wants to wait on medication until after his prelims are completed.    Return to clinic: as needed    Length of Service (minutes): 60

## 2022-06-15 ENCOUNTER — OFFICE VISIT (OUTPATIENT)
Dept: PSYCHIATRY | Facility: CLINIC | Age: 22
End: 2022-06-15
Payer: COMMERCIAL

## 2022-06-15 DIAGNOSIS — F33.2 SEVERE EPISODE OF RECURRENT MAJOR DEPRESSIVE DISORDER, WITHOUT PSYCHOTIC FEATURES: Primary | ICD-10-CM

## 2022-06-15 DIAGNOSIS — F41.1 GAD (GENERALIZED ANXIETY DISORDER): ICD-10-CM

## 2022-06-15 PROCEDURE — 90791 PR PSYCHIATRIC DIAGNOSTIC EVALUATION: ICD-10-PCS | Mod: 95,,, | Performed by: PSYCHOLOGIST

## 2022-06-15 PROCEDURE — 90791 PSYCH DIAGNOSTIC EVALUATION: CPT | Mod: 95,,, | Performed by: PSYCHOLOGIST

## 2022-06-15 NOTE — PROGRESS NOTES
[facility] Psychiatry Initial Visit (PhD/LCSW)      Patient Name:  Anthony Lares  Date: 06/15/2022    Site:  [facility] The patient location is: Home in [place], [place]  The chief complaint leading to consultation is: anxiety and depression    Visit type: audiovisual    Face to Face time with patient: 40  60 minutes of total time spent on the encounter, which includes face to face time and non-face to face time preparing to see the patient (eg, review of tests), Obtaining and/or reviewing separately obtained history, Documenting clinical information in the electronic or other health record, Independently interpreting results (not separately reported) and communicating results to the patient/family/caregiver, or Care coordination (not separately reported).     Each patient to whom he or she provides medical services by telemedicine is:  (1) informed of the relationship between the physician and patient and the respective role of any other health care provider with respect to management of the patient; and (2) notified that he or she may decline to receive medical services by telemedicine and may withdraw from such care at any time.    Notes:   Referral source: Joleen Marrufo, PhD     Chief complaint/reason for encounter:  Psychological Evaluation to assess suitability for admission to the [facility]   Clinical status of patient:  Outpatient   Met with:  Patient   CPT Code: 18306      Before this evaluation was initiated, the purposes and process of the assessment and the limits of confidentiality were discussed with the patient who expressed understanding of these issues and verbally consented to proceed with the evaluation.      History of present illness:  Mr. Lares is a 22-year-old male who is pursuing psychotherapy to improve depression and anxiety. Patient states, I definitely struggle with anxiety and depression. I got off of Prozac in August and that coincided with a bunch of other big life  "changes. Thats when things got worse. I have trouble controlling worry. I have social anxiety. As far as the depression, its been lots of hopelessness, lack of motivation, doubt. It makes it hard to interact with people and with moving to a new area recently and COVID, Chloe really struggled to make new friends. I struggle with studying, getting out bed.     Medical history: noncontributory - elevated cholesterol, prolonged staph infection     Psychiatric symptoms:   Depression:  Patient endorsed depressed mood, diminished interest, insomnia, fatigue, worthlessness/guilt, poor concentration, passive thoughts of death, tearfulness, social isolation. He denied active SI or plans.  Neva/Hypomania:  Denied.  He denied periods of elevated mood or abnormally increased energy or goal-directed activity.   Anxiety:  Patient endorsed excessive anxiety/worry, restlessness/keyed up, irritability, muscle tension, feeling overwhelmed  Thoughts:  Denied delusions, hallucinations.   Suicidal thoughts/behaviors:  Denied.   Self-injury:  Denied.   Sleep:  "Hard to get to sleep, but then I sleep too much. Im constantly tired."   Cognitive:  Denied problems.      Current psychosocial stressors:  school  Report of coping skills:  I don't really have any  Support system:  family, friends back home     Current and past substance use:   Alcohol:  Denied current use.  Denied history of abuse or dependency.   Drugs: Marijuana - "a few hits" nightly   Tobacco:  Denied current use.   Caffeine:  Denied current use.      Current Psychiatric Treatment:   Medications:  Currently sees Shawna Self NP prescribed effexor  Psychotherapy:  Currently sees Dr. Marrufo weekly for supportive therapy    Psychiatric history:   Previous diagnosis: MDD, STEPHANE  Previous hospitalizations:  denied  History of outpatient treatment: has participated in counseling/psychotherapy on an outpatient basis in the past - three times - high school, twice in undergrad - via " "[facility] center - "did not find very helpful due to forced use of CBT by residents in training"   Previous suicide attempt:  Denied.   Family history of psychiatric illness: Mother - anxiety and depression, Sister - depression  Access to guns: Denied     Trauma history:  Denied.      Social history:  Mr. Lares was born and raised in [place], [place] by his biological parents along with two younger sisters.  He described his childhood as good.  He denied childhood trauma, abuse, and neglect. He has three Bachelor's degrees in Political Science, Economics, and Math and is currently in an economics PhD program.  He also does private tutoring.  He denied  service.  He is not on disability.  He is single, recently ended a 5-year relationship a few months ago.  He has no children.       Legal history:  He denied history of arrests and convictions.  He is not currently involved in civil or criminal litigation.      Mental Status Exam:   General appearance:  Appears stated age, neatly dressed, well groomed   Speech:  Normal rate, normal tone, normal pitch, normal volume   Level of cooperation:  Cooperative   Thought processes:  Logical, goal-directed   Mood:  Euthymic   Thought content:  No illusions, no visual hallucinations, no auditory hallucinations, no delusions, no active or passive homicidal thoughts, no active or passive suicidal ideation, no obsessions, no compulsions, no violence   Affect:  Appropriate   Orientation:  Oriented to person, place, and date   Memory:   Recent memory:  Intact  Remote memory: Intact   Attention span and concentration: appropriate  Fund of general knowledge: appropriate   Abstract reasoning:     Similarities: Abstract   Proverbs: Abstract   Judgment and insight: Fair   Language:  Intact      Diagnostic impression:     ICD-10-CM ICD-9-CM   1. Severe episode of recurrent major depressive disorder, without psychotic features  F33.2 296.33   2. STEPHANE (generalized anxiety " disorder)  F41.1 300.02           Plan:  Mr. Lares will be admitted to the [facility].  He understood [facility] guidelines and signed the [facility] Informed Consent and [facility]s Partnership Agreement.  He was provided with information about [facility] Clinic treatments and will proceed with the Unified Protocol.

## 2022-06-16 ENCOUNTER — PATIENT MESSAGE (OUTPATIENT)
Dept: PSYCHIATRY | Facility: CLINIC | Age: 22
End: 2022-06-16
Payer: COMMERCIAL

## 2022-06-17 ENCOUNTER — LAB VISIT (OUTPATIENT)
Dept: LAB | Facility: HOSPITAL | Age: 22
End: 2022-06-17
Payer: COMMERCIAL

## 2022-06-17 DIAGNOSIS — F33.2 SEVERE EPISODE OF RECURRENT MAJOR DEPRESSIVE DISORDER, WITHOUT PSYCHOTIC FEATURES: ICD-10-CM

## 2022-06-17 LAB
ALBUMIN SERPL BCP-MCNC: 4.4 G/DL (ref 3.5–5.2)
ALP SERPL-CCNC: 76 U/L (ref 55–135)
ALT SERPL W/O P-5'-P-CCNC: 16 U/L (ref 10–44)
ANION GAP SERPL CALC-SCNC: 9 MMOL/L (ref 8–16)
AST SERPL-CCNC: 18 U/L (ref 10–40)
BILIRUB SERPL-MCNC: 0.4 MG/DL (ref 0.1–1)
BUN SERPL-MCNC: 7 MG/DL (ref 6–20)
CALCIUM SERPL-MCNC: 9.9 MG/DL (ref 8.7–10.5)
CHLORIDE SERPL-SCNC: 107 MMOL/L (ref 95–110)
CO2 SERPL-SCNC: 25 MMOL/L (ref 23–29)
CREAT SERPL-MCNC: 0.8 MG/DL (ref 0.5–1.4)
EST. GFR  (AFRICAN AMERICAN): >60 ML/MIN/1.73 M^2
EST. GFR  (NON AFRICAN AMERICAN): >60 ML/MIN/1.73 M^2
GLUCOSE SERPL-MCNC: 130 MG/DL (ref 70–110)
POTASSIUM SERPL-SCNC: 3.7 MMOL/L (ref 3.5–5.1)
PROT SERPL-MCNC: 7.7 G/DL (ref 6–8.4)
SODIUM SERPL-SCNC: 141 MMOL/L (ref 136–145)

## 2022-06-17 PROCEDURE — 36415 COLL VENOUS BLD VENIPUNCTURE: CPT | Performed by: NURSE PRACTITIONER

## 2022-06-17 PROCEDURE — 80053 COMPREHEN METABOLIC PANEL: CPT | Performed by: NURSE PRACTITIONER

## 2022-06-21 DIAGNOSIS — F33.2 SEVERE EPISODE OF RECURRENT MAJOR DEPRESSIVE DISORDER, WITHOUT PSYCHOTIC FEATURES: ICD-10-CM

## 2022-06-21 RX ORDER — VENLAFAXINE HYDROCHLORIDE 75 MG/1
75 CAPSULE, EXTENDED RELEASE ORAL DAILY
Qty: 30 CAPSULE | Refills: 2 | Status: SHIPPED | OUTPATIENT
Start: 2022-06-21 | End: 2022-06-30

## 2022-06-21 RX ORDER — VENLAFAXINE HYDROCHLORIDE 37.5 MG/1
37.5 CAPSULE, EXTENDED RELEASE ORAL DAILY
Qty: 7 CAPSULE | Refills: 0 | Status: SHIPPED | OUTPATIENT
Start: 2022-06-21 | End: 2022-06-30 | Stop reason: SDUPTHER

## 2022-06-22 ENCOUNTER — OFFICE VISIT (OUTPATIENT)
Dept: PSYCHIATRY | Facility: CLINIC | Age: 22
End: 2022-06-22
Payer: COMMERCIAL

## 2022-06-22 DIAGNOSIS — F41.1 GAD (GENERALIZED ANXIETY DISORDER): ICD-10-CM

## 2022-06-22 DIAGNOSIS — F33.2 SEVERE EPISODE OF RECURRENT MAJOR DEPRESSIVE DISORDER, WITHOUT PSYCHOTIC FEATURES: Primary | ICD-10-CM

## 2022-06-22 PROCEDURE — 90834 PSYTX W PT 45 MINUTES: CPT | Mod: 95,,, | Performed by: PSYCHOLOGIST

## 2022-06-22 PROCEDURE — 90834 PR PSYCHOTHERAPY W/PATIENT, 45 MIN: ICD-10-PCS | Mod: 95,,, | Performed by: PSYCHOLOGIST

## 2022-06-22 NOTE — PROGRESS NOTES
Individual Psychotherapy (PhD/LCSW)    6/22/2022    Site:  Telemed     The patient location is: [place], [place]  The chief complaint leading to consultation is: anxiety, depression    Visit type: audiovisual    Face to Face time with patient: 39  45 minutes of total time spent on the encounter, which includes face to face time and non-face to face time preparing to see the patient (eg, review of tests), Obtaining and/or reviewing separately obtained history, Documenting clinical information in the electronic or other health record, Independently interpreting results (not separately reported) and communicating results to the patient/family/caregiver, or Care coordination (not separately reported).       Each patient to whom he or she provides medical services by telemedicine is:  (1) informed of the relationship between the physician and patient and the respective role of any other health care provider with respect to management of the patient; and (2) notified that he or she may decline to receive medical services by telemedicine and may withdraw from such care at any time.    Notes:     Therapeutic Intervention: Met with patient.  Outpatient - Insight oriented psychotherapy 45 min - CPT code 45338    Chief complaint/reason for encounter: depression and anxiety     Interval history and content of current session: Patient completed session one of the Unified Protocol for Transdiagnostic Treatment of Emotional Disorders. Session focus was understanding your emotions. The purpose of emotions was discussed. The three components of emotions (behavior, thought, physical sensation) were discussed for common emotions (anxiety, depression, anger, fear). Patient will complete at least one three-component model form per day for homework.     Treatment plan:  · Target symptoms: depression, anxiety   · Why chosen therapy is appropriate versus another modality: relevant to diagnosis, evidence based practice  · Outcome  monitoring methods: self-report, checklist/rating scale  · Therapeutic intervention type: insight oriented psychotherapy    Risk parameters:  Patient reports no suicidal ideation  Patient reports no homicidal ideation  Patient reports no self-injurious behavior  Patient reports no violent behavior    Verbal deficits: None    Patient's response to intervention:  The patient's response to intervention is accepting.    Progress toward goals and other mental status changes:  The patient's progress toward goals is fair .    Diagnosis:     ICD-10-CM ICD-9-CM   1. Severe episode of recurrent major depressive disorder, without psychotic features  F33.2 296.33   2. STEPHANE (generalized anxiety disorder)  F41.1 300.02       Plan:  individual psychotherapy    Return to clinic: 1 week    Length of Service (minutes): 45

## 2022-06-29 ENCOUNTER — PATIENT MESSAGE (OUTPATIENT)
Dept: PSYCHIATRY | Facility: CLINIC | Age: 22
End: 2022-06-29
Payer: COMMERCIAL

## 2022-06-30 DIAGNOSIS — F33.2 SEVERE EPISODE OF RECURRENT MAJOR DEPRESSIVE DISORDER, WITHOUT PSYCHOTIC FEATURES: ICD-10-CM

## 2022-06-30 RX ORDER — VENLAFAXINE HYDROCHLORIDE 37.5 MG/1
37.5 CAPSULE, EXTENDED RELEASE ORAL DAILY
Qty: 30 CAPSULE | Refills: 2 | Status: SHIPPED | OUTPATIENT
Start: 2022-06-30 | End: 2022-07-19 | Stop reason: SDUPTHER

## 2022-06-30 RX ORDER — VENLAFAXINE HYDROCHLORIDE 37.5 MG/1
37.5 CAPSULE, EXTENDED RELEASE ORAL DAILY
Qty: 30 CAPSULE | Refills: 2 | Status: SHIPPED | OUTPATIENT
Start: 2022-06-30 | End: 2022-06-30 | Stop reason: SDUPTHER

## 2022-07-05 DIAGNOSIS — L08.9 SUPERFICIAL SKIN INFECTION: Primary | ICD-10-CM

## 2022-07-05 RX ORDER — MUPIROCIN 20 MG/G
OINTMENT TOPICAL 2 TIMES DAILY
Qty: 30 G | Refills: 11 | Status: SHIPPED | OUTPATIENT
Start: 2022-07-05 | End: 2023-01-25

## 2022-07-05 RX ORDER — CLINDAMYCIN HYDROCHLORIDE 150 MG/1
300 CAPSULE ORAL EVERY 12 HOURS
Qty: 120 CAPSULE | Refills: 2 | Status: SHIPPED | OUTPATIENT
Start: 2022-07-05 | End: 2022-10-03

## 2022-07-05 RX ORDER — RIFAMPIN 300 MG/1
CAPSULE ORAL
Qty: 60 CAPSULE | Refills: 2 | Status: SHIPPED | OUTPATIENT
Start: 2022-07-05 | End: 2022-12-12

## 2022-07-06 ENCOUNTER — TELEPHONE (OUTPATIENT)
Dept: DERMATOLOGY | Facility: CLINIC | Age: 22
End: 2022-07-06
Payer: COMMERCIAL

## 2022-07-06 NOTE — TELEPHONE ENCOUNTER
----- Message from Charu Wayne RN sent at 7/1/2022  5:02 PM CDT -----  Contact: @ [phone]    ----- Message -----  From: Billy Jackson  Sent: 7/1/2022   3:41 PM CDT  To: Diomedes Richards Staff    Patient calling to get the 3rd month refill on (clindamycin (CLEOCIN) 150 MG capsule )  & (rifAMpin (RIFADIN) 300 MG capsule ) sent to:     [address]  [address] [place] [id number]  Phone: [phone] Fax: [phone]

## 2022-07-07 ENCOUNTER — OFFICE VISIT (OUTPATIENT)
Dept: PSYCHIATRY | Facility: CLINIC | Age: 22
End: 2022-07-07
Payer: COMMERCIAL

## 2022-07-07 DIAGNOSIS — F33.2 SEVERE EPISODE OF RECURRENT MAJOR DEPRESSIVE DISORDER, WITHOUT PSYCHOTIC FEATURES: Primary | ICD-10-CM

## 2022-07-07 DIAGNOSIS — F41.1 GAD (GENERALIZED ANXIETY DISORDER): ICD-10-CM

## 2022-07-07 PROCEDURE — 90837 PR PSYCHOTHERAPY W/PATIENT, 60 MIN: ICD-10-PCS | Mod: 95,,, | Performed by: PSYCHOLOGIST

## 2022-07-07 PROCEDURE — 90837 PSYTX W PT 60 MINUTES: CPT | Mod: 95,,, | Performed by: PSYCHOLOGIST

## 2022-07-07 NOTE — PROGRESS NOTES
Individual Psychotherapy (PhD/LCSW)    7/7/2022    Therapeutic Intervention: Met with patient.  Outpatient - Behavior modifying psychotherapy 60 min - CPT code 73195    The patient location is: Patient's home/ Patient reported that his location at the time of this visit was in the [facility]     Visit type: Virtual visit with synchronous audio and video     Each patient to whom he or she provides medical services by telemedicine is: (1) informed of the relationship between the physician and patient and the respective role of any other health care provider with respect to management of the patient; and (2) notified that he or she may decline to receive medical services by telemedicine and may withdraw from such care at any time.    Chief complaint/reason for encounter: depression and anxiety     Interval history and content of current session: Patient presented casually dressed, alert, and oriented.  Patient reported experiencing feeling overwhelmed, depressed mood, diminished interest, insomnia, fatigue, worthlessness/guilt, poor concentration, thoughts of death, tearfulness, social isolation, decreased memory, excessive anxiety/worry, restlessness/keyed up, irritability, muscle tension and panic attacks.  Patient denied current suicidal and homicidal ideations.  Explored source of patient's depressed mood.  Patient discussed his experience so far in the [facility] clinic.  Active listening skills were used as patient described his history of not having many male friends.  Patient exhibited good insight when he stated that he does not like the competitiveness of many men or stereotypical male behavior as it pertains to women.  Patient also discussed his difficulty around being overly respectful in order to avoid appearing arrogant.  Assisted patient in processing how he thinks of himself and socializing with others.    Treatment plan:  · Target symptoms: depression, anxiety   · Why chosen therapy is appropriate  versus another modality: relevant to diagnosis  · Outcome monitoring methods: self-report  · Therapeutic intervention type: insight oriented psychotherapy, behavior modifying psychotherapy    Risk parameters:  Patient reports no suicidal ideation  Patient reports no homicidal ideation  Patient reports no self-injurious behavior  Patient reports no violent behavior    Verbal deficits: None    Patient's response to intervention:  The patient's response to intervention is accepting.    Progress toward goals and other mental status changes:  The patient's progress toward goals is fair .    Diagnosis:     ICD-10-CM ICD-9-CM   1. Severe episode of recurrent major depressive disorder, without psychotic features  F33.2 296.33   2. STEPHANE (generalized anxiety disorder)  F41.1 300.02       Plan:  individual psychotherapy and consult psychiatrist for medication evaluation     Return to clinic: as needed    Length of Service (minutes): 60

## 2022-07-12 ENCOUNTER — OFFICE VISIT (OUTPATIENT)
Dept: PSYCHIATRY | Facility: CLINIC | Age: 22
End: 2022-07-12
Payer: COMMERCIAL

## 2022-07-12 DIAGNOSIS — F41.1 GAD (GENERALIZED ANXIETY DISORDER): ICD-10-CM

## 2022-07-12 DIAGNOSIS — F33.2 SEVERE EPISODE OF RECURRENT MAJOR DEPRESSIVE DISORDER, WITHOUT PSYCHOTIC FEATURES: Primary | ICD-10-CM

## 2022-07-12 PROCEDURE — 90834 PR PSYCHOTHERAPY W/PATIENT, 45 MIN: ICD-10-PCS | Mod: 95,,, | Performed by: PSYCHOLOGIST

## 2022-07-12 PROCEDURE — 90834 PSYTX W PT 45 MINUTES: CPT | Mod: 95,,, | Performed by: PSYCHOLOGIST

## 2022-07-12 NOTE — Clinical Note
This patient chose to discontinue BEBP treatment today, stating the material is something he has already covered numerous times in the past.

## 2022-07-12 NOTE — PROGRESS NOTES
"Individual Psychotherapy (PhD/LCSW)    7/12/2022    Site:  Telemed     The patient location is: [place], [place]  The chief complaint leading to consultation is: anxiety, depression    Visit type: audiovisual    Face to Face time with patient: 39  45 minutes of total time spent on the encounter, which includes face to face time and non-face to face time preparing to see the patient (eg, review of tests), Obtaining and/or reviewing separately obtained history, Documenting clinical information in the electronic or other health record, Independently interpreting results (not separately reported) and communicating results to the patient/family/caregiver, or Care coordination (not separately reported).       Each patient to whom he or she provides medical services by telemedicine is:  (1) informed of the relationship between the physician and patient and the respective role of any other health care provider with respect to management of the patient; and (2) notified that he or she may decline to receive medical services by telemedicine and may withdraw from such care at any time.    Notes:     Therapeutic Intervention: Met with patient.  Outpatient - Insight oriented psychotherapy 45 min - CPT code 36443    Chief complaint/reason for encounter: depression and anxiety     Interval history and content of current session: Patient completed session two of the Unified Protocol for Transdiagnostic Treatment of Emotional Disorders, which focused on the ARC of emotions. What triggers an emotional response (antecedent) and the short- and long-term consequences of an emotional response were discussed. The importance of noticing patterns and identifying triggers early on were highlighted.     Patient expressed some frustration that the material is not new to him and that he has "done lots of CBT before in the past and it doesn't really help." Provider previewed the material in remaining sessions to allow patient to assess " whether or not continuing sessions would be helpful. Patient stated every topic sounded like something he has covered before and that he thinks meeting with Dr. Marrufo would be a better use of his time. Provider thanked patient for his honesty and agreed to cancel the rest of his sessions.       Treatment plan:  · Target symptoms: depression, anxiety   · Why chosen therapy is appropriate versus another modality: relevant to diagnosis, evidence based practice  · Outcome monitoring methods: self-report, checklist/rating scale  · Therapeutic intervention type: insight oriented psychotherapy    Risk parameters:  Patient reports no suicidal ideation  Patient reports no homicidal ideation  Patient reports no self-injurious behavior  Patient reports no violent behavior    Verbal deficits: None    Patient's response to intervention:  The patient's response to intervention is accepting.    Progress toward goals and other mental status changes:  The patient's progress toward goals is fair .    Diagnosis:     ICD-10-CM ICD-9-CM   1. Severe episode of recurrent major depressive disorder, without psychotic features  F33.2 296.33   2. STEPHANE (generalized anxiety disorder)  F41.1 300.02       Plan:  individual psychotherapy - Patient will be referred back to his referring provider for continued care after determining the Unified Protocol is not something he is interested in completing.    Return to clinic: As scheduled with Dr. Marrufo    Length of Service (minutes): 45

## 2022-07-18 ENCOUNTER — OFFICE VISIT (OUTPATIENT)
Dept: PSYCHIATRY | Facility: CLINIC | Age: 22
End: 2022-07-18
Payer: COMMERCIAL

## 2022-07-18 DIAGNOSIS — F41.1 GAD (GENERALIZED ANXIETY DISORDER): ICD-10-CM

## 2022-07-18 DIAGNOSIS — F33.2 SEVERE EPISODE OF RECURRENT MAJOR DEPRESSIVE DISORDER, WITHOUT PSYCHOTIC FEATURES: Primary | ICD-10-CM

## 2022-07-18 PROCEDURE — 90837 PSYTX W PT 60 MINUTES: CPT | Mod: 95,,, | Performed by: PSYCHOLOGIST

## 2022-07-18 PROCEDURE — 90837 PR PSYCHOTHERAPY W/PATIENT, 60 MIN: ICD-10-PCS | Mod: 95,,, | Performed by: PSYCHOLOGIST

## 2022-07-18 NOTE — PROGRESS NOTES
Individual Psychotherapy (PhD/LCSW)    7/18/2022    Therapeutic Intervention: Met with patient.  Outpatient - Behavior modifying psychotherapy 60 min - CPT code 27139    The patient location is: Patient's home/ Patient reported that his location at the time of this visit was in the [facility]     Visit type: Virtual visit with synchronous audio and video     Each patient to whom he or she provides medical services by telemedicine is: (1) informed of the relationship between the physician and patient and the respective role of any other health care provider with respect to management of the patient; and (2) notified that he or she may decline to receive medical services by telemedicine and may withdraw from such care at any time.    Chief complaint/reason for encounter: depression and anxiety     Interval history and content of current session: Patient presented casually dressed, alert, and oriented.  Patient reported experiencing feeling overwhelmed, depressed mood, diminished interest, insomnia, fatigue, worthlessness/guilt, poor concentration, thoughts of death, tearfulness, social isolation, decreased memory, excessive anxiety/worry, restlessness/keyed up, irritability, muscle tension and panic attacks.  Patient denied current suicidal and homicidal ideations.  Explored source of patient's depressed mood.  Patient discussed recently ending treatment in the [facility] clinic.  Active listening skills were used as patient described his desire for more social interaction and to make more friends.  Assisted patient in exploring what his interests are in order to find natural activities to participate in which he may meet new people.  Patient exhibited good insight when he stated that he tends to be passionate about work activities.  Explored ways for patient to find new activities that he enjoys.      Treatment plan:  · Target symptoms: depression, anxiety   · Why chosen therapy is appropriate versus another  modality: relevant to diagnosis  · Outcome monitoring methods: self-report  · Therapeutic intervention type: insight oriented psychotherapy, behavior modifying psychotherapy    Risk parameters:  Patient reports no suicidal ideation  Patient reports no homicidal ideation  Patient reports no self-injurious behavior  Patient reports no violent behavior    Verbal deficits: None    Patient's response to intervention:  The patient's response to intervention is accepting.    Progress toward goals and other mental status changes:  The patient's progress toward goals is fair .    Diagnosis:     ICD-10-CM ICD-9-CM   1. Severe episode of recurrent major depressive disorder, without psychotic features  F33.2 296.33   2. STEPHANE (generalized anxiety disorder)  F41.1 300.02       Plan:  individual psychotherapy and medication management by physician     Return to clinic: 2 weeks    Length of Service (minutes): 60

## 2022-07-19 ENCOUNTER — PATIENT MESSAGE (OUTPATIENT)
Dept: PSYCHIATRY | Facility: CLINIC | Age: 22
End: 2022-07-19
Payer: COMMERCIAL

## 2022-07-19 DIAGNOSIS — F33.2 SEVERE EPISODE OF RECURRENT MAJOR DEPRESSIVE DISORDER, WITHOUT PSYCHOTIC FEATURES: ICD-10-CM

## 2022-07-19 RX ORDER — VENLAFAXINE HYDROCHLORIDE 37.5 MG/1
37.5 CAPSULE, EXTENDED RELEASE ORAL DAILY
Qty: 90 CAPSULE | Refills: 0 | Status: SHIPPED | OUTPATIENT
Start: 2022-07-19 | End: 2022-10-07 | Stop reason: SDUPTHER

## 2022-07-22 PROBLEM — E78.2 MIXED HYPERLIPIDEMIA: Status: ACTIVE | Noted: 2022-07-22

## 2022-07-22 PROBLEM — F32.A ANXIETY AND DEPRESSION: Status: ACTIVE | Noted: 2022-07-22

## 2022-07-22 PROBLEM — E66.9 OBESITY: Status: ACTIVE | Noted: 2022-07-22

## 2022-07-22 PROBLEM — F41.9 ANXIETY AND DEPRESSION: Status: ACTIVE | Noted: 2022-07-22

## 2022-07-22 PROCEDURE — 86780 TREPONEMA PALLIDUM: CPT | Performed by: FAMILY MEDICINE

## 2022-07-22 PROCEDURE — 87389 HIV-1 AG W/HIV-1&-2 AB AG IA: CPT | Performed by: FAMILY MEDICINE

## 2022-07-22 PROCEDURE — 80074 ACUTE HEPATITIS PANEL: CPT | Performed by: FAMILY MEDICINE

## 2022-08-17 ENCOUNTER — OFFICE VISIT (OUTPATIENT)
Dept: PSYCHIATRY | Facility: CLINIC | Age: 22
End: 2022-08-17
Payer: COMMERCIAL

## 2022-08-17 DIAGNOSIS — F41.1 GAD (GENERALIZED ANXIETY DISORDER): ICD-10-CM

## 2022-08-17 DIAGNOSIS — F33.2 SEVERE EPISODE OF RECURRENT MAJOR DEPRESSIVE DISORDER, WITHOUT PSYCHOTIC FEATURES: Primary | ICD-10-CM

## 2022-08-17 PROCEDURE — 90837 PR PSYCHOTHERAPY W/PATIENT, 60 MIN: ICD-10-PCS | Mod: 95,,, | Performed by: PSYCHOLOGIST

## 2022-08-17 PROCEDURE — 90837 PSYTX W PT 60 MINUTES: CPT | Mod: 95,,, | Performed by: PSYCHOLOGIST

## 2022-08-22 ENCOUNTER — PATIENT MESSAGE (OUTPATIENT)
Dept: DERMATOLOGY | Facility: CLINIC | Age: 22
End: 2022-08-22
Payer: COMMERCIAL

## 2022-08-24 ENCOUNTER — OFFICE VISIT (OUTPATIENT)
Dept: DERMATOLOGY | Facility: CLINIC | Age: 22
End: 2022-08-24
Payer: COMMERCIAL

## 2022-08-24 DIAGNOSIS — L08.9 SUPERFICIAL SKIN INFECTION: Primary | ICD-10-CM

## 2022-08-24 PROCEDURE — 99213 PR OFFICE/OUTPT VISIT, EST, LEVL III, 20-29 MIN: ICD-10-PCS | Mod: S$GLB,,, | Performed by: DERMATOLOGY

## 2022-08-24 PROCEDURE — 99999 PR PBB SHADOW E&M-EST. PATIENT-LVL III: CPT | Mod: PBBFAC,,, | Performed by: DERMATOLOGY

## 2022-08-24 PROCEDURE — 1159F MED LIST DOCD IN RCRD: CPT | Mod: CPTII,S$GLB,, | Performed by: DERMATOLOGY

## 2022-08-24 PROCEDURE — 99999 PR PBB SHADOW E&M-EST. PATIENT-LVL III: ICD-10-PCS | Mod: PBBFAC,,, | Performed by: DERMATOLOGY

## 2022-08-24 PROCEDURE — 99213 OFFICE O/P EST LOW 20 MIN: CPT | Mod: S$GLB,,, | Performed by: DERMATOLOGY

## 2022-08-24 PROCEDURE — 1159F PR MEDICATION LIST DOCUMENTED IN MEDICAL RECORD: ICD-10-PCS | Mod: CPTII,S$GLB,, | Performed by: DERMATOLOGY

## 2022-08-24 RX ORDER — DOXYCYCLINE 100 MG/1
100 TABLET ORAL 2 TIMES DAILY
Qty: 120 TABLET | Refills: 0 | Status: SHIPPED | OUTPATIENT
Start: 2022-08-24 | End: 2022-10-23

## 2022-08-24 RX ORDER — MUPIROCIN 20 MG/G
OINTMENT TOPICAL 3 TIMES DAILY
Qty: 30 G | Refills: 5 | Status: SHIPPED | OUTPATIENT
Start: 2022-08-24 | End: 2022-08-29 | Stop reason: SDUPTHER

## 2022-08-24 RX ORDER — CLINDAMYCIN PHOSPHATE 11.9 MG/ML
SOLUTION TOPICAL
Qty: 60 ML | Refills: 3 | Status: SHIPPED | OUTPATIENT
Start: 2022-08-24 | End: 2022-12-12 | Stop reason: SDUPTHER

## 2022-08-24 RX ORDER — RIFAMPIN 300 MG/1
300 CAPSULE ORAL EVERY 12 HOURS
Qty: 180 CAPSULE | Refills: 0 | Status: SHIPPED | OUTPATIENT
Start: 2022-08-24 | End: 2022-11-22

## 2022-08-24 NOTE — PROGRESS NOTES
Individual Psychotherapy (PhD/LCSW)    8/17/2022    Therapeutic Intervention: Met with patient.  Outpatient - Behavior modifying psychotherapy 60 min - CPT code 85270    The patient location is: Patient's home/ Patient reported that his location at the time of this visit was in the [facility]     Visit type: Virtual visit with synchronous audio and video     Each patient to whom he or she provides medical services by telemedicine is: (1) informed of the relationship between the physician and patient and the respective role of any other health care provider with respect to management of the patient; and (2) notified that he or she may decline to receive medical services by telemedicine and may withdraw from such care at any time.    Chief complaint/reason for encounter: depression and anxiety     Interval history and content of current session: Patient presented casually dressed, alert, and oriented.  Patient reported experiencing feeling overwhelmed, depressed mood, diminished interest, insomnia, fatigue, worthlessness/guilt, poor concentration, thoughts of death, tearfulness, social isolation, decreased memory, excessive anxiety/worry, restlessness/keyed up, irritability, muscle tension and panic attacks.  Patient denied current suicidal and homicidal ideations.  Explored source of patient's depressed mood.  Patient discussed recently going on a hiking trip and visiting with his ex partner.  Active listening skills were used as patient described identifying as bisexual and his desire to explore his sexuality.  Assisted patient in processing his feelings around his sexuality in which he indicated that he has only told his ex partner about.  Explored the connection between patient's sexuality and his lack of friendships with men especially given that patient has not shared his sexual identity with many others.     Treatment plan:  · Target symptoms: depression, anxiety   · Why chosen therapy is appropriate  versus another modality: relevant to diagnosis  · Outcome monitoring methods: self-report  · Therapeutic intervention type: insight oriented psychotherapy, behavior modifying psychotherapy    Risk parameters:  Patient reports no suicidal ideation  Patient reports no homicidal ideation  Patient reports no self-injurious behavior  Patient reports no violent behavior    Verbal deficits: None    Patient's response to intervention:  The patient's response to intervention is accepting.    Progress toward goals and other mental status changes:  The patient's progress toward goals is fair .    Diagnosis:     ICD-10-CM ICD-9-CM   1. Severe episode of recurrent major depressive disorder, without psychotic features  F33.2 296.33   2. STEPHANE (generalized anxiety disorder)  F41.1 300.02       Plan:  individual psychotherapy and medication management by physician     Return to clinic: 2 weeks    Length of Service (minutes): 60

## 2022-08-24 NOTE — PROGRESS NOTES
Subjective:       Patient ID:  Anthony Lares is a 22 y.o. male who presents for   Chief Complaint   Patient presents with    Lesion    Lesion     Diffuse      Lesion - Follow-up  Diagnosis: superficial skin infection   Symptom course: worsening  Currently using: rifadin and doxy   Affected locations: diffuse  Affected locations: diffuse  Signs / symptoms: itching  Severity: mild to moderate        Review of Systems   Constitutional: Negative for fever, chills and fatigue.   Skin: Negative for daily sunscreen use, recent sunburn and wears hat.   Hematologic/Lymphatic: Does not bruise/bleed easily.        Objective:    Physical Exam   Constitutional: He appears well-developed and well-nourished. No distress.   Neurological: He is alert and oriented to person, place, and time. He is not disoriented.   Psychiatric: He has a normal mood and affect.   Skin:   Areas Examined (abnormalities noted in diagram):   Back Inspection Performed                      Diagram Legend     Erythematous scaling macule/papule c/w actinic keratosis       Vascular papule c/w angioma      Pigmented verrucoid papule/plaque c/w seborrheic keratosis      Yellow umbilicated papule c/w sebaceous hyperplasia      Irregularly shaped tan macule c/w lentigo     1-2 mm smooth white papules consistent with Milia      Movable subcutaneous cyst with punctum c/w epidermal inclusion cyst      Subcutaneous movable cyst c/w pilar cyst      Firm pink to brown papule c/w dermatofibroma      Pedunculated fleshy papule(s) c/w skin tag(s)      Evenly pigmented macule c/w junctional nevus     Mildly variegated pigmented, slightly irregular-bordered macule c/w mildly atypical nevus      Flesh colored to evenly pigmented papule c/w intradermal nevus       Pink pearly papule/plaque c/w basal cell carcinoma      Erythematous hyperkeratotic cursted plaque c/w SCC      Surgical scar with no sign of skin cancer recurrence      Open and closed comedones       Inflammatory papules and pustules      Verrucoid papule consistent consistent with wart     Erythematous eczematous patches and plaques     Dystrophic onycholytic nail with subungual debris c/w onychomycosis     Umbilicated papule    Erythematous-base heme-crusted tan verrucoid plaque consistent with inflamed seborrheic keratosis     Erythematous Silvery Scaling Plaque c/w Psoriasis     See annotation      Assessment / Plan:        Superficial skin infection  -     rifAMpin (RIFADIN) 300 MG capsule; Take 1 capsule (300 mg total) by mouth every 12 (twelve) hours.  Dispense: 180 capsule; Refill: 0  -     mupirocin (BACTROBAN) 2 % ointment; Apply topically 3 (three) times daily. To nares  Dispense: 30 g; Refill: 5  -     clindamycin (CLEOCIN T) 1 % external solution; AAA bid  Dispense: 60 mL; Refill: 3  -     doxycycline monohydrate 100 mg Tab; Take 1 tablet (100 mg total) by mouth 2 (two) times daily.  Dispense: 120 tablet; Refill: 0    Discussed benefits and risks of doxycyline therapy including but not limited to GI discomfort, esophageal irritation/ulceration, and increased sun sensitivity. Patient was counseled to take medicine with meals and at least 1 hour before lying down.      Counseled patient that rifampin may turn bodily fluids red-orange including urine, saliva, nasal discharge, tears. It may turn contacts red yellow it may also cause birth control to be ineffective,  advised patient to use backup form contraception if necessary     Start muprocin BID to nares    Start clindamycin solution to lesions of concern twice daily    Wash with hibclens or benzyl peroxide daily  while infection is present, then 3 x a week              If lesions spread, are unbearbly painful or if you have fever please go to the ER to seek urgent care as there may be a need for IV antibiotics    F/u 3-4  mo          No follow-ups on file.

## 2022-08-24 NOTE — PATIENT INSTRUCTIONS
Discussed benefits and risks of doxycyline therapy including but not limited to GI discomfort, esophageal irritation/ulceration, and increased sun sensitivity. Patient was counseled to take medicine with meals and at least 1 hour before lying down.      Counseled patient that rifampin may turn bodily fluids red-orange including urine, saliva, nasal discharge, tears. It may turn contacts red yellow it may also cause birth control to be ineffective,  advised patient to use backup form contraception if necessary     Start muprocin BID to nares    Start clindamycin solution to lesions of concern twice daily    Wash with hibclens or benzyl peroxide daily  while infection is present, then 3 x a week              If lesions spread, are unbearbly painful or if you have fever please go to the ER to seek urgent care as there may be a need for IV antibiotics

## 2022-08-25 ENCOUNTER — OFFICE VISIT (OUTPATIENT)
Dept: OPTOMETRY | Facility: CLINIC | Age: 22
End: 2022-08-25
Payer: COMMERCIAL

## 2022-08-25 DIAGNOSIS — H53.2 DIPLOPIA: ICD-10-CM

## 2022-08-25 DIAGNOSIS — H50.00 ESOTROPIA: Primary | ICD-10-CM

## 2022-08-25 PROBLEM — F12.10 CANNABIS ABUSE, DAILY USE: Status: ACTIVE | Noted: 2019-08-08

## 2022-08-25 PROCEDURE — 92060 PR SPECIAL EYE EVAL,SENSORIMOTOR: ICD-10-PCS | Mod: S$GLB,,, | Performed by: OPTOMETRIST

## 2022-08-25 PROCEDURE — 99999 PR PBB SHADOW E&M-EST. PATIENT-LVL III: CPT | Mod: PBBFAC,,, | Performed by: OPTOMETRIST

## 2022-08-25 PROCEDURE — 92060 SENSORIMOTOR EXAMINATION: CPT | Mod: S$GLB,,, | Performed by: OPTOMETRIST

## 2022-08-25 PROCEDURE — 92004 PR EYE EXAM, NEW PATIENT,COMPREHESV: ICD-10-PCS | Mod: S$GLB,,, | Performed by: OPTOMETRIST

## 2022-08-25 PROCEDURE — 1159F MED LIST DOCD IN RCRD: CPT | Mod: CPTII,S$GLB,, | Performed by: OPTOMETRIST

## 2022-08-25 PROCEDURE — 92004 COMPRE OPH EXAM NEW PT 1/>: CPT | Mod: S$GLB,,, | Performed by: OPTOMETRIST

## 2022-08-25 PROCEDURE — 99999 PR PBB SHADOW E&M-EST. PATIENT-LVL III: ICD-10-PCS | Mod: PBBFAC,,, | Performed by: OPTOMETRIST

## 2022-08-25 PROCEDURE — 1159F PR MEDICATION LIST DOCUMENTED IN MEDICAL RECORD: ICD-10-PCS | Mod: CPTII,S$GLB,, | Performed by: OPTOMETRIST

## 2022-08-26 ENCOUNTER — PATIENT MESSAGE (OUTPATIENT)
Dept: OPTOMETRY | Facility: CLINIC | Age: 22
End: 2022-08-26
Payer: COMMERCIAL

## 2022-08-27 ENCOUNTER — PATIENT MESSAGE (OUTPATIENT)
Dept: PSYCHIATRY | Facility: CLINIC | Age: 22
End: 2022-08-27
Payer: COMMERCIAL

## 2022-08-29 PROBLEM — H50.00 ESOTROPIA: Status: ACTIVE | Noted: 2022-08-29

## 2022-08-29 NOTE — PROGRESS NOTES
HPI    Anthony Lares is a 22 y.o. male who comes in upon referral by Dr. Nava Quijano for diplopia.  Anthony explains that he has noticed   diplopia in some form since childhood.  He says that he was able to   control it then, however, over the last several years it has become more   problematic.  He says that it is worse at distance. He has been told that   diplopia was secondary to astigmatism, however, glasses have not resolved   this.     (--)blurred vision  (--)Headaches  (+)diplopia  (--)flashes  (--)floaters  (--)pain  (--)Itching  (--)tearing  (--)burning  (--)Dryness  (--) OTC Drops  (--)Photophobia     Last edited by Sury Brush, OD on 8/25/2022 11:29 AM.        Review of Systems   Constitutional:  Negative for chills, fever and malaise/fatigue.   HENT:  Negative for congestion, hearing loss and sore throat.    Eyes:  Positive for blurred vision and double vision. Negative for photophobia, pain, discharge and redness.   Respiratory: Negative.  Negative for cough, shortness of breath and wheezing.    Cardiovascular: Negative.    Gastrointestinal: Negative.  Negative for nausea and vomiting.   Genitourinary: Negative.    Musculoskeletal: Negative.    Skin: Negative.    Neurological:  Negative for seizures.   Psychiatric/Behavioral: Negative.       Base Eye Exam       Visual Acuity (Snellen - Linear)         Right Left    Dist cc 20/20 -2 20/20 -1    Near cc j1 j1      Correction: Glasses              Tonometry (Tonopen, 10:47 AM)         Right Left    Pressure 19 20              Pupils         Pupils Dark Light Shape React APD    Right PERRL 5 3 Round Brisk None    Left PERRL 5 3 Round Brisk None              Visual Fields         Right Left     Full Full              Extraocular Movement         Right Left     Full Full              Neuro/Psych       Oriented x3: Yes    Mood/Affect: Normal                  Additional Tests       Stereo       Animals: 0    Circles: 0                  Strabismus Exam        Reading #1   (Edited by: Sury Brush, OD)      Method: Cover-uncover    Fixing Eye: Left    Correction: cc      Distance Near Near +3DS Near Bifocals            ET' 16          - - - - - -   - - - - - -            ET 16           - -  - -   - -  - -            ET 16           - - - - - -   - - - - - -          ET 16                 Reading #2   (Edited by: Sury Brush, OD)      Method: Cover-uncover      Distance Near Near +3DS Near Bifocals            ET' 16          - - - - - -   - - - - - -            ET 16           - -  - -   - -  - -            ET 16           - - - - - -   - - - - - -          ET 16                     Slit Lamp and Fundus Exam       Slit Lamp Exam         Right Left    Lids/Lashes Normal Normal    Conjunctiva/Sclera White and quiet White and quiet    Cornea Clear Clear    Anterior Chamber Deep and quiet Deep and quiet    Iris Round and reactive Round and reactive    Lens Clear Clear                  Refraction       Wearing Rx         Sphere Cylinder Axis    Right -2.00 +0.50 045    Left -1.50 Sphere               Manifest Refraction         Sphere Cylinder Axis    Right -2.25 +0.50 045    Left -2.00 +0.50 150                    Assessment /Plan       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 Quijano and advise on remaking Anthony's prescription with HER most recent refraction and   Adding 8 pd base OUT to each eye (16 base out split)      Patient education; Re consult with me as needed

## 2022-08-31 ENCOUNTER — OFFICE VISIT (OUTPATIENT)
Dept: PSYCHIATRY | Facility: CLINIC | Age: 22
End: 2022-08-31
Payer: COMMERCIAL

## 2022-08-31 DIAGNOSIS — F41.1 GAD (GENERALIZED ANXIETY DISORDER): ICD-10-CM

## 2022-08-31 DIAGNOSIS — F33.2 SEVERE EPISODE OF RECURRENT MAJOR DEPRESSIVE DISORDER, WITHOUT PSYCHOTIC FEATURES: Primary | ICD-10-CM

## 2022-08-31 PROCEDURE — 90837 PSYTX W PT 60 MINUTES: CPT | Mod: 95,,, | Performed by: PSYCHOLOGIST

## 2022-08-31 PROCEDURE — 90837 PR PSYCHOTHERAPY W/PATIENT, 60 MIN: ICD-10-PCS | Mod: 95,,, | Performed by: PSYCHOLOGIST

## 2022-08-31 NOTE — PROGRESS NOTES
Individual Psychotherapy (PhD/LCSW)    8/31/2022    Therapeutic Intervention: Met with patient.  Outpatient - Behavior modifying psychotherapy 60 min - CPT code 69438    The patient location is: Patient's home/ Patient reported that his location at the time of this visit was in the [facility]     Visit type: Virtual visit with synchronous audio and video     Each patient to whom he or she provides medical services by telemedicine is: (1) informed of the relationship between the physician and patient and the respective role of any other health care provider with respect to management of the patient; and (2) notified that he or she may decline to receive medical services by telemedicine and may withdraw from such care at any time.    Chief complaint/reason for encounter: depression and anxiety     Interval history and content of current session: Patient presented casually dressed, alert, and oriented.  Patient reported experiencing feeling overwhelmed, depressed mood, diminished interest, insomnia, fatigue, worthlessness/guilt, poor concentration, thoughts of death, tearfulness, social isolation, decreased memory, excessive anxiety/worry, restlessness/keyed up, irritability, muscle tension and panic attacks.  Patient denied current suicidal and homicidal ideations.  Explored source of patient's depressed mood.  Patient discussed missing his ex partner especially since his recent trip to visit her.  Active listening skills were used as patient described his five year relationship and ongoing friendship with his ex partner including their plans for the future when they are not  by distance.  Assisted patient in processing his feelings around his current relationship with his ex partner.      Treatment plan:  Target symptoms: depression, anxiety   Why chosen therapy is appropriate versus another modality: relevant to diagnosis  Outcome monitoring methods: self-report  Therapeutic intervention type:  insight oriented psychotherapy, behavior modifying psychotherapy    Risk parameters:  Patient reports no suicidal ideation  Patient reports no homicidal ideation  Patient reports no self-injurious behavior  Patient reports no violent behavior    Verbal deficits: None    Patient's response to intervention:  The patient's response to intervention is accepting.    Progress toward goals and other mental status changes:  The patient's progress toward goals is fair .    Diagnosis:     ICD-10-CM ICD-9-CM   1. Severe episode of recurrent major depressive disorder, without psychotic features  F33.2 296.33   2. STEPHANE (generalized anxiety disorder)  F41.1 300.02       Plan:  individual psychotherapy and medication management by physician     Return to clinic: 2 weeks    Length of Service (minutes): 60

## 2022-09-21 ENCOUNTER — HISTORICAL (OUTPATIENT)
Dept: ADMINISTRATIVE | Facility: HOSPITAL | Age: 22
End: 2022-09-21
Payer: COMMERCIAL

## 2022-09-28 ENCOUNTER — OFFICE VISIT (OUTPATIENT)
Dept: PSYCHIATRY | Facility: CLINIC | Age: 22
End: 2022-09-28
Payer: COMMERCIAL

## 2022-09-28 DIAGNOSIS — F33.0 MDD (MAJOR DEPRESSIVE DISORDER), RECURRENT EPISODE, MILD: Primary | ICD-10-CM

## 2022-09-28 DIAGNOSIS — F41.1 GAD (GENERALIZED ANXIETY DISORDER): ICD-10-CM

## 2022-09-28 PROCEDURE — 90837 PR PSYCHOTHERAPY W/PATIENT, 60 MIN: ICD-10-PCS | Mod: 95,,, | Performed by: PSYCHOLOGIST

## 2022-09-28 PROCEDURE — 90837 PSYTX W PT 60 MINUTES: CPT | Mod: 95,,, | Performed by: PSYCHOLOGIST

## 2022-09-28 NOTE — PROGRESS NOTES
Individual Psychotherapy (PhD/LCSW)    9/28/2022    Therapeutic Intervention: Met with patient.  Outpatient - Behavior modifying psychotherapy 60 min - CPT code 03072    The patient location is: Patient's home/ Patient reported that his location at the time of this visit was in the [facility]     Visit type: Virtual visit with synchronous audio and video     Each patient to whom he or she provides medical services by telemedicine is: (1) informed of the relationship between the physician and patient and the respective role of any other health care provider with respect to management of the patient; and (2) notified that he or she may decline to receive medical services by telemedicine and may withdraw from such care at any time.    Chief complaint/reason for encounter: depression and anxiety     Interval history and content of current session: Patient presented casually dressed, alert, and oriented.  Patient reported experiencing feeling overwhelmed, depressed mood, diminished interest, insomnia, fatigue, worthlessness/guilt, poor concentration, thoughts of death, tearfulness, social isolation, decreased memory, excessive anxiety/worry, restlessness/keyed up, irritability, muscle tension and panic attacks.  Patient denied current suicidal and homicidal ideations.  Explored source of patient's depressed mood.  Patient discussed recent health concerns around an ongoing skin condition.  Active listening skills were used as patient described having difficulty finding appropriate self care activities for recreation since most of his interest are related to school/work.  Assisted patient in exploring his interests and self care activities.  Patient was taught behavioral coping strategies such as physical exercise, increased social involvement, relaxation skills, sharing of feelings, and writing in a journal as ways to reduce feelings of anxiety and depression.  Patient also discussed his excitement over the  possibility of his ex partner re turing to [place] for school, but he does not want to pressure her.  Educated patient about using assertive communication skills to address his concerns and feelings with his ex partner.                           Treatment plan:  Target symptoms: depression, anxiety   Why chosen therapy is appropriate versus another modality: relevant to diagnosis  Outcome monitoring methods: self-report  Therapeutic intervention type: insight oriented psychotherapy, behavior modifying psychotherapy    Risk parameters:  Patient reports no suicidal ideation  Patient reports no homicidal ideation  Patient reports no self-injurious behavior  Patient reports no violent behavior    Verbal deficits: None    Patient's response to intervention:  The patient's response to intervention is accepting.    Progress toward goals and other mental status changes:  The patient's progress toward goals is fair .    STEPHANE-7 Score: (P) 6  Interpretation: (P) Mild Anxiety     PHQ8 Score : (P) 7  PHQ8 Interpretation: (P) Mild Depression    Diagnosis:     ICD-10-CM ICD-9-CM   1. MDD (major depressive disorder), recurrent episode, mild  F33.0 296.31   2. STEPHANE (generalized anxiety disorder)  F41.1 300.02       Plan:  individual psychotherapy and medication management by physician     Return to clinic: 2 weeks    Length of Service (minutes): 60

## 2022-10-07 DIAGNOSIS — F33.2 SEVERE EPISODE OF RECURRENT MAJOR DEPRESSIVE DISORDER, WITHOUT PSYCHOTIC FEATURES: ICD-10-CM

## 2022-10-07 RX ORDER — VENLAFAXINE HYDROCHLORIDE 37.5 MG/1
37.5 CAPSULE, EXTENDED RELEASE ORAL DAILY
Qty: 90 CAPSULE | Refills: 0 | Status: SHIPPED | OUTPATIENT
Start: 2022-10-07 | End: 2023-02-20 | Stop reason: SDUPTHER

## 2022-10-24 ENCOUNTER — OFFICE VISIT (OUTPATIENT)
Dept: PSYCHIATRY | Facility: CLINIC | Age: 22
End: 2022-10-24
Payer: COMMERCIAL

## 2022-10-24 DIAGNOSIS — F33.0 MDD (MAJOR DEPRESSIVE DISORDER), RECURRENT EPISODE, MILD: Primary | ICD-10-CM

## 2022-10-24 DIAGNOSIS — F41.1 GAD (GENERALIZED ANXIETY DISORDER): ICD-10-CM

## 2022-10-24 PROCEDURE — 99999 PR PBB SHADOW E&M-EST. PATIENT-LVL I: CPT | Mod: PBBFAC,,, | Performed by: PSYCHOLOGIST

## 2022-10-24 PROCEDURE — 99999 PR PBB SHADOW E&M-EST. PATIENT-LVL I: ICD-10-PCS | Mod: PBBFAC,,, | Performed by: PSYCHOLOGIST

## 2022-10-24 PROCEDURE — 90837 PSYTX W PT 60 MINUTES: CPT | Mod: 95,S$GLB,, | Performed by: PSYCHOLOGIST

## 2022-10-24 PROCEDURE — 90837 PR PSYCHOTHERAPY W/PATIENT, 60 MIN: ICD-10-PCS | Mod: 95,S$GLB,, | Performed by: PSYCHOLOGIST
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
## 2022-11-01 ENCOUNTER — OFFICE VISIT (OUTPATIENT)
Dept: PSYCHIATRY | Facility: CLINIC | Age: 22
End: 2022-11-01
Payer: COMMERCIAL

## 2022-11-01 DIAGNOSIS — F33.0 MDD (MAJOR DEPRESSIVE DISORDER), RECURRENT EPISODE, MILD: Primary | ICD-10-CM

## 2022-11-01 DIAGNOSIS — F41.1 GAD (GENERALIZED ANXIETY DISORDER): ICD-10-CM

## 2022-11-01 PROCEDURE — 90837 PR PSYCHOTHERAPY W/PATIENT, 60 MIN: ICD-10-PCS | Mod: 95,,, | Performed by: PSYCHOLOGIST

## 2022-11-01 PROCEDURE — 90837 PSYTX W PT 60 MINUTES: CPT | Mod: 95,,, | Performed by: PSYCHOLOGIST

## 2022-11-01 PROCEDURE — 99999 PR PBB SHADOW E&M-EST. PATIENT-LVL I: ICD-10-PCS | Mod: PBBFAC,,, | Performed by: PSYCHOLOGIST

## 2022-11-01 PROCEDURE — 99999 PR PBB SHADOW E&M-EST. PATIENT-LVL I: CPT | Mod: PBBFAC,,, | Performed by: PSYCHOLOGIST

## 2022-11-07 ENCOUNTER — PATIENT MESSAGE (OUTPATIENT)
Dept: DERMATOLOGY | Facility: CLINIC | Age: 22
End: 2022-11-07
Payer: COMMERCIAL

## 2022-11-09 NOTE — PROGRESS NOTES
Individual Psychotherapy (PhD/LCSW)    11/1/2022    Therapeutic Intervention: Met with patient.  Outpatient - Behavior modifying psychotherapy 60 min - CPT code 11107    The patient location is: Patient's home/ Patient reported that his location at the time of this visit was in the [facility]     Visit type: Virtual visit with synchronous audio and video     Each patient to whom he or she provides medical services by telemedicine is: (1) informed of the relationship between the physician and patient and the respective role of any other health care provider with respect to management of the patient; and (2) notified that he or she may decline to receive medical services by telemedicine and may withdraw from such care at any time.    Chief complaint/reason for encounter: depression and anxiety     Interval history and content of current session: Patient presented casually dressed, alert, and oriented.  Patient reported experiencing feeling overwhelmed, depressed mood, diminished interest, insomnia, fatigue, worthlessness/guilt, poor concentration, thoughts of death, tearfulness, social isolation, decreased memory, excessive anxiety/worry, restlessness/keyed up, irritability, muscle tension and panic attacks.  Patient denied current suicidal and homicidal ideations.  Explored source of patient's depressed mood.  Patient discussed feeling bored in his downtime from school.  Active listening skills were used as patient described his pattern of not having hobbies which is why he has been able to engage in so many scholastic endeavors.  Patient exhibited good insight when he stated that he tends to overwhelm himself with academic duties that leave little time for him to participate in activities he enjoys so he does not actually know what he likes to do besides schoolwork.  Assisted patient in exploring hobbies and activities that he enjoys.  Patient was taught behavioral coping strategies such as physical  exercise, increased social involvement, relaxation skills, focusing on self care, and writing in a journal as ways to reduce feelings of anxiety and depression.                         Treatment plan:  Target symptoms: depression, anxiety   Why chosen therapy is appropriate versus another modality: relevant to diagnosis  Outcome monitoring methods: self-report  Therapeutic intervention type: insight oriented psychotherapy, behavior modifying psychotherapy    Risk parameters:  Patient reports no suicidal ideation  Patient reports no homicidal ideation  Patient reports no self-injurious behavior  Patient reports no violent behavior    Verbal deficits: None    Patient's response to intervention:  The patient's response to intervention is accepting.    Progress toward goals and other mental status changes:  The patient's progress toward goals is fair .    STEPHANE-7 Score: (P) 6  Interpretation: (P) Mild Anxiety     PHQ8 Score : (P) 10  PHQ8 Interpretation: (P) Moderate Depression    Diagnosis:     ICD-10-CM ICD-9-CM   1. MDD (major depressive disorder), recurrent episode, mild  F33.0 296.31   2. STEPHANE (generalized anxiety disorder)  F41.1 300.02       Plan:  individual psychotherapy and medication management by physician     Return to clinic: 2 weeks    Length of Service (minutes): 60

## 2022-11-10 ENCOUNTER — OFFICE VISIT (OUTPATIENT)
Dept: PSYCHIATRY | Facility: CLINIC | Age: 22
End: 2022-11-10
Payer: COMMERCIAL

## 2022-11-10 DIAGNOSIS — F41.1 GAD (GENERALIZED ANXIETY DISORDER): ICD-10-CM

## 2022-11-10 DIAGNOSIS — F33.0 MDD (MAJOR DEPRESSIVE DISORDER), RECURRENT EPISODE, MILD: Primary | ICD-10-CM

## 2022-11-10 PROCEDURE — 99999 PR PBB SHADOW E&M-EST. PATIENT-LVL I: CPT | Mod: PBBFAC,,, | Performed by: PSYCHOLOGIST

## 2022-11-10 PROCEDURE — 99999 PR PBB SHADOW E&M-EST. PATIENT-LVL I: ICD-10-PCS | Mod: PBBFAC,,, | Performed by: PSYCHOLOGIST

## 2022-11-10 PROCEDURE — 90837 PSYTX W PT 60 MINUTES: CPT | Mod: 95,,, | Performed by: PSYCHOLOGIST

## 2022-11-10 PROCEDURE — 90837 PR PSYCHOTHERAPY W/PATIENT, 60 MIN: ICD-10-PCS | Mod: 95,,, | Performed by: PSYCHOLOGIST

## 2022-11-10 NOTE — PROGRESS NOTES
Individual Psychotherapy (PhD/LCSW)    11/10/2022    Therapeutic Intervention: Met with patient.  Outpatient - Behavior modifying psychotherapy 60 min - CPT code 27533    The patient location is: Patient's home/ Patient reported that his location at the time of this visit was in the [facility]     Visit type: Virtual visit with synchronous audio and video     Each patient to whom he or she provides medical services by telemedicine is: (1) informed of the relationship between the physician and patient and the respective role of any other health care provider with respect to management of the patient; and (2) notified that he or she may decline to receive medical services by telemedicine and may withdraw from such care at any time.    Chief complaint/reason for encounter: depression and anxiety     Interval history and content of current session: Patient presented casually dressed, alert, and oriented.  Patient reported experiencing feeling overwhelmed, depressed mood, diminished interest, insomnia, fatigue, worthlessness/guilt, poor concentration, thoughts of death, tearfulness, social isolation, decreased memory, excessive anxiety/worry, restlessness/keyed up, irritability, muscle tension and panic attacks.  Patient denied current suicidal and homicidal ideations.  Explored source of patient's depressed mood.  Patient discussed his family relationships as he plans to go home to visit over the weekend.  He discussed his concerns about his mother's strained relationship with her mother and wanting to be supportive of his mother.  Discussed patient being supportive of his mother without taking on her feelings and pushing his opinions on her.  Active listening skills were used as patient described his relationship with his twin sisters and their tendency to come to him when they have interpersonal problems.  Assisted patient in exploring his role in the relationships with his sisters.  Educated patient about  using assertive communication skills to address his concerns and feelings with his sisters.     Treatment plan:  Target symptoms: depression, anxiety   Why chosen therapy is appropriate versus another modality: relevant to diagnosis  Outcome monitoring methods: self-report  Therapeutic intervention type: insight oriented psychotherapy, behavior modifying psychotherapy    Risk parameters:  Patient reports no suicidal ideation  Patient reports no homicidal ideation  Patient reports no self-injurious behavior  Patient reports no violent behavior    Verbal deficits: None    Patient's response to intervention:  The patient's response to intervention is accepting.    Progress toward goals and other mental status changes:  The patient's progress toward goals is fair .    STEPHANE-7 Score: (P) 6  Interpretation: (P) Mild Anxiety     PHQ8 Score : (P) 9  PHQ8 Interpretation: (P) Mild Depression    Diagnosis:     ICD-10-CM ICD-9-CM   1. MDD (major depressive disorder), recurrent episode, mild  F33.0 296.31   2. STEPHANE (generalized anxiety disorder)  F41.1 300.02       Plan:  individual psychotherapy and medication management by physician     Return to clinic: 2 weeks    Length of Service (minutes): 60

## 2022-11-14 ENCOUNTER — PATIENT MESSAGE (OUTPATIENT)
Dept: PSYCHIATRY | Facility: CLINIC | Age: 22
End: 2022-11-14
Payer: COMMERCIAL

## 2022-11-17 ENCOUNTER — TELEPHONE (OUTPATIENT)
Dept: PSYCHIATRY | Facility: CLINIC | Age: 22
End: 2022-11-17
Payer: COMMERCIAL

## 2022-11-26 ENCOUNTER — PATIENT MESSAGE (OUTPATIENT)
Dept: PSYCHIATRY | Facility: CLINIC | Age: 22
End: 2022-11-26
Payer: COMMERCIAL

## 2022-11-29 ENCOUNTER — PATIENT MESSAGE (OUTPATIENT)
Dept: DERMATOLOGY | Facility: CLINIC | Age: 22
End: 2022-11-29
Payer: COMMERCIAL

## 2022-11-30 ENCOUNTER — PATIENT MESSAGE (OUTPATIENT)
Dept: PSYCHIATRY | Facility: CLINIC | Age: 22
End: 2022-11-30
Payer: COMMERCIAL

## 2022-12-02 ENCOUNTER — PATIENT MESSAGE (OUTPATIENT)
Dept: DERMATOLOGY | Facility: CLINIC | Age: 22
End: 2022-12-02
Payer: COMMERCIAL

## 2022-12-12 ENCOUNTER — OFFICE VISIT (OUTPATIENT)
Dept: DERMATOLOGY | Facility: CLINIC | Age: 22
End: 2022-12-12
Payer: COMMERCIAL

## 2022-12-12 DIAGNOSIS — L98.9 DISEASE OF SKIN AND SUBCUTANEOUS TISSUE: ICD-10-CM

## 2022-12-12 DIAGNOSIS — L08.9 SUPERFICIAL SKIN INFECTION: Primary | ICD-10-CM

## 2022-12-12 PROCEDURE — 99214 PR OFFICE/OUTPT VISIT, EST, LEVL IV, 30-39 MIN: ICD-10-PCS | Mod: 25,S$GLB,, | Performed by: DERMATOLOGY

## 2022-12-12 PROCEDURE — 99999 PR PBB SHADOW E&M-EST. PATIENT-LVL III: ICD-10-PCS | Mod: PBBFAC,,, | Performed by: DERMATOLOGY

## 2022-12-12 PROCEDURE — 88305 TISSUE EXAM BY PATHOLOGIST: CPT | Mod: 26,,, | Performed by: PATHOLOGY

## 2022-12-12 PROCEDURE — 1159F PR MEDICATION LIST DOCUMENTED IN MEDICAL RECORD: ICD-10-PCS | Mod: CPTII,S$GLB,, | Performed by: DERMATOLOGY

## 2022-12-12 PROCEDURE — 87070 CULTURE OTHR SPECIMN AEROBIC: CPT | Performed by: DERMATOLOGY

## 2022-12-12 PROCEDURE — 99999 PR PBB SHADOW E&M-EST. PATIENT-LVL III: CPT | Mod: PBBFAC,,, | Performed by: DERMATOLOGY

## 2022-12-12 PROCEDURE — 1159F MED LIST DOCD IN RCRD: CPT | Mod: CPTII,S$GLB,, | Performed by: DERMATOLOGY

## 2022-12-12 PROCEDURE — 99214 OFFICE O/P EST MOD 30 MIN: CPT | Mod: 25,S$GLB,, | Performed by: DERMATOLOGY

## 2022-12-12 PROCEDURE — 11104 PUNCH BX SKIN SINGLE LESION: CPT | Mod: S$GLB,,, | Performed by: DERMATOLOGY

## 2022-12-12 PROCEDURE — 88305 TISSUE EXAM BY PATHOLOGIST: ICD-10-PCS | Mod: 26,,, | Performed by: PATHOLOGY

## 2022-12-12 PROCEDURE — 11104 PR PUNCH BIOPSY, SKIN, SINGLE LESION: ICD-10-PCS | Mod: S$GLB,,, | Performed by: DERMATOLOGY

## 2022-12-12 PROCEDURE — 88305 TISSUE EXAM BY PATHOLOGIST: CPT | Performed by: PATHOLOGY

## 2022-12-12 RX ORDER — CLINDAMYCIN PHOSPHATE 11.9 MG/ML
SOLUTION TOPICAL
Qty: 60 ML | Refills: 3 | Status: SHIPPED | OUTPATIENT
Start: 2022-12-12 | End: 2023-01-25

## 2022-12-12 NOTE — PATIENT INSTRUCTIONS
Discussed bactrim as below with patient will call in if positive culture:  Discussed benefits and risks of therapy including but not limited to rash, increased sun sensitivity, and severe allergic reaction. Patient instructed to go to the ER if difficulty breathing or swallowing develops.     Vs. Linezolid      Punch Biopsy Wound Care    Your doctor has performed a punch biopsy today.  A band aid and antibiotic ointment has been placed over the site.  This should remain in place for 24 hours.  It is recommended that you keep the area dry for the first 24 hours.  After 24 hours, you may remove the band aid and wash the area with warm soap and water and apply Vaseline jelly.  Many patients prefer to use Neosporin or Bacitracin ointment.  This is acceptable; however know that you can develop an allergy to this medication even if you have used it safely for years.  It is important to keep the area moist.  Letting it dry out and get air slows healing time, will worsen the scar, and make it more difficult to remove the stitches if they were placed.  Band aid is optional after first 24 hours.      If you notice increasing redness, tenderness, pain, or yellow drainage at the biopsy or surgical site, please notify your doctor.  These are signs of an infection.    If your biopsy/surgical site is bleeding, apply firm pressure for 15 minutes straight.  Repeat for another 15 minutes, if it is still bleeding.   If the surgical site continues to bleed, then please contact your doctor.      For [facility] users:   You will receive your biopsy results in [facility] as soon as they are available. Please be assured that your physician/provider will review your results and will then determine what further treatment, evaluation, or planning is required. You should be contacted by your physician's/provider's office within 5 business days of receiving your results; If not, please reach out to directly. This is one more way [facility] is  putting you first.       [address] [place] [address]/ [phone] FAX/ [email or web address]            Woundcare [phone]

## 2022-12-12 NOTE — PROGRESS NOTES
Subjective:       Patient ID:  Anthony Lares is a 22 y.o. male who presents for   Chief Complaint   Patient presents with    Follow-up     Superficial skin infection     Follow-up - Follow-up  Diagnosis: superficial skin infection.  Symptom course: stable  Currently using: rifadin and doxy.  Affected locations: diffuse  Signs / symptoms: asymptomatic  Severity: mild    Review of Systems   Constitutional:  Negative for fever, chills and fatigue.   Skin:  Negative for daily sunscreen use, recent sunburn and wears hat.   Hematologic/Lymphatic: Does not bruise/bleed easily.      Objective:    Physical Exam   Constitutional: He appears well-developed and well-nourished. No distress.   Neurological: He is alert and oriented to person, place, and time. He is not disoriented.   Psychiatric: He has a normal mood and affect.   Skin:   Areas Examined (abnormalities noted in diagram):   Chest / Axilla Inspection Performed  Back Inspection Performed                    Diagram Legend     Erythematous scaling macule/papule c/w actinic keratosis       Vascular papule c/w angioma      Pigmented verrucoid papule/plaque c/w seborrheic keratosis      Yellow umbilicated papule c/w sebaceous hyperplasia      Irregularly shaped tan macule c/w lentigo     1-2 mm smooth white papules consistent with Milia      Movable subcutaneous cyst with punctum c/w epidermal inclusion cyst      Subcutaneous movable cyst c/w pilar cyst      Firm pink to brown papule c/w dermatofibroma      Pedunculated fleshy papule(s) c/w skin tag(s)      Evenly pigmented macule c/w junctional nevus     Mildly variegated pigmented, slightly irregular-bordered macule c/w mildly atypical nevus      Flesh colored to evenly pigmented papule c/w intradermal nevus       Pink pearly papule/plaque c/w basal cell carcinoma      Erythematous hyperkeratotic cursted plaque c/w SCC      Surgical scar with no sign of skin cancer recurrence      Open and closed comedones       Inflammatory papules and pustules      Verrucoid papule consistent consistent with wart     Erythematous eczematous patches and plaques     Dystrophic onycholytic nail with subungual debris c/w onychomycosis     Umbilicated papule    Erythematous-base heme-crusted tan verrucoid plaque consistent with inflamed seborrheic keratosis     Erythematous Silvery Scaling Plaque c/w Psoriasis     See annotation            Assessment / Plan:      Pathology Orders:       Normal Orders This Visit    Specimen to Pathology, Dermatology     Questions:    Procedure Type: Dermatology and skin neoplasms    Number of Specimens: 1    ------------------------: -------------------------    Spec 1 Procedure: Biopsy    Spec 1 Clinical Impression: r/o staph infection vs other cause of punched out ulcerations    Spec 1 Source: left inner thigh    Release to patient: Immediate          Superficial skin infection  -     Aerobic culture  -     clindamycin (CLEOCIN T) 1 % external solution; AAA bid  Dispense: 60 mL; Refill: 3  -     Ambulatory referral/consult to Infectious Disease; Future; Expected date: 12/19/2022  Will do culture as still showing lesions and biospy to make sure its staph    Disease of skin and subcutaneous tissue  -     Specimen to Pathology, Dermatology  Punch biopsy procedure note:  Punch biopsy performed after verbal consent obtained. Area marked and prepped with alcohol. Approximately 1cc of 1% lidocaine with epinephrine injected. 4 mm disposable punch used to remove lesion. Hemostasis obtained and biopsy site closed with 1 - 2 Prolene sutures. Wound care instructions reviewed with patient and handout given.    Discussed bactrim as below with patient will call in if positive culture:  Discussed benefits and risks of therapy including but not limited to rash, increased sun sensitivity, and severe allergic reaction. Patient instructed to go to the ER if difficulty breathing or swallowing develops.     Vs. Linezolid              No follow-ups on file.

## 2022-12-16 LAB — BACTERIA SPEC AEROBE CULT: NO GROWTH

## 2022-12-21 ENCOUNTER — PATIENT MESSAGE (OUTPATIENT)
Dept: DERMATOLOGY | Facility: CLINIC | Age: 22
End: 2022-12-21
Payer: COMMERCIAL

## 2022-12-21 LAB
FINAL PATHOLOGIC DIAGNOSIS: NORMAL
Lab: NORMAL

## 2022-12-25 ENCOUNTER — PATIENT MESSAGE (OUTPATIENT)
Dept: PSYCHIATRY | Facility: CLINIC | Age: 22
End: 2022-12-25
Payer: COMMERCIAL

## 2022-12-28 ENCOUNTER — PATIENT MESSAGE (OUTPATIENT)
Dept: PSYCHIATRY | Facility: CLINIC | Age: 22
End: 2022-12-28
Payer: COMMERCIAL

## 2022-12-28 ENCOUNTER — PATIENT MESSAGE (OUTPATIENT)
Dept: DERMATOLOGY | Facility: CLINIC | Age: 22
End: 2022-12-28
Payer: COMMERCIAL

## 2022-12-28 DIAGNOSIS — L30.8 PERIVASCULAR DERMATITIS: Primary | ICD-10-CM

## 2022-12-28 RX ORDER — TRIAMCINOLONE ACETONIDE 1 MG/G
OINTMENT TOPICAL
Qty: 80 G | Refills: 3 | Status: SHIPPED | OUTPATIENT
Start: 2022-12-28 | End: 2022-12-29

## 2022-12-28 NOTE — PROGRESS NOTES
Make sure patient has follow up with me in the next 2 weeks. Biopsy showsthere is no infection but something in your body causing the inflammation it could be viral or drug related. I would discontinue all oral medications if possible. I would like to follow up in January regarding this. I am also going to send in topical steriod for you to apply to all lesions.

## 2022-12-29 RX ORDER — TRIAMCINOLONE ACETONIDE 1 MG/G
OINTMENT TOPICAL
Qty: 80 G | Refills: 3 | Status: SHIPPED | OUTPATIENT
Start: 2022-12-29 | End: 2023-02-20

## 2022-12-29 NOTE — TELEPHONE ENCOUNTER
He can stop all 3 topical medications. It appears it is a drug reaction. He can also cancel appt with infectious disease. We can [facility] prescription to [facility].

## 2022-12-30 ENCOUNTER — PATIENT MESSAGE (OUTPATIENT)
Dept: DERMATOLOGY | Facility: CLINIC | Age: 22
End: 2022-12-30
Payer: COMMERCIAL

## 2023-01-01 ENCOUNTER — PATIENT MESSAGE (OUTPATIENT)
Dept: DERMATOLOGY | Facility: CLINIC | Age: 23
End: 2023-01-01
Payer: COMMERCIAL

## 2023-01-03 ENCOUNTER — PATIENT MESSAGE (OUTPATIENT)
Dept: OPTOMETRY | Facility: CLINIC | Age: 23
End: 2023-01-03
Payer: COMMERCIAL

## 2023-01-10 ENCOUNTER — PATIENT MESSAGE (OUTPATIENT)
Dept: PSYCHIATRY | Facility: CLINIC | Age: 23
End: 2023-01-10

## 2023-01-10 ENCOUNTER — TELEPHONE (OUTPATIENT)
Dept: PSYCHIATRY | Facility: CLINIC | Age: 23
End: 2023-01-10
Payer: COMMERCIAL

## 2023-01-16 ENCOUNTER — PATIENT MESSAGE (OUTPATIENT)
Dept: DERMATOLOGY | Facility: CLINIC | Age: 23
End: 2023-01-16
Payer: COMMERCIAL

## 2023-01-23 ENCOUNTER — TELEPHONE (OUTPATIENT)
Dept: DERMATOLOGY | Facility: CLINIC | Age: 23
End: 2023-01-23
Payer: COMMERCIAL

## 2023-01-25 ENCOUNTER — PATIENT MESSAGE (OUTPATIENT)
Dept: DERMATOLOGY | Facility: CLINIC | Age: 23
End: 2023-01-25

## 2023-01-25 ENCOUNTER — OFFICE VISIT (OUTPATIENT)
Dept: DERMATOLOGY | Facility: CLINIC | Age: 23
End: 2023-01-25
Payer: COMMERCIAL

## 2023-01-25 DIAGNOSIS — L98.9 DISEASE OF SKIN AND SUBCUTANEOUS TISSUE: Primary | ICD-10-CM

## 2023-01-25 PROCEDURE — 1159F PR MEDICATION LIST DOCUMENTED IN MEDICAL RECORD: ICD-10-PCS | Mod: CPTII,S$GLB,, | Performed by: DERMATOLOGY

## 2023-01-25 PROCEDURE — 99214 PR OFFICE/OUTPT VISIT, EST, LEVL IV, 30-39 MIN: ICD-10-PCS | Mod: 25,S$GLB,, | Performed by: DERMATOLOGY

## 2023-01-25 PROCEDURE — 88305 TISSUE EXAM BY PATHOLOGIST: ICD-10-PCS | Mod: 26,,, | Performed by: DERMATOLOGY

## 2023-01-25 PROCEDURE — 99999 PR PBB SHADOW E&M-EST. PATIENT-LVL III: CPT | Mod: PBBFAC,,, | Performed by: DERMATOLOGY

## 2023-01-25 PROCEDURE — 99214 OFFICE O/P EST MOD 30 MIN: CPT | Mod: 25,S$GLB,, | Performed by: DERMATOLOGY

## 2023-01-25 PROCEDURE — 88312 SPECIAL STAINS GROUP 1: CPT | Mod: 59 | Performed by: DERMATOLOGY

## 2023-01-25 PROCEDURE — 88312 SPECIAL STAINS GROUP 1: CPT | Mod: 26,,, | Performed by: DERMATOLOGY

## 2023-01-25 PROCEDURE — 88305 TISSUE EXAM BY PATHOLOGIST: CPT | Performed by: DERMATOLOGY

## 2023-01-25 PROCEDURE — 11104 PR PUNCH BIOPSY, SKIN, SINGLE LESION: ICD-10-PCS | Mod: S$GLB,,, | Performed by: DERMATOLOGY

## 2023-01-25 PROCEDURE — 88305 TISSUE EXAM BY PATHOLOGIST: CPT | Mod: 26,,, | Performed by: DERMATOLOGY

## 2023-01-25 PROCEDURE — 88342 IMHCHEM/IMCYTCHM 1ST ANTB: CPT | Performed by: DERMATOLOGY

## 2023-01-25 PROCEDURE — 1159F MED LIST DOCD IN RCRD: CPT | Mod: CPTII,S$GLB,, | Performed by: DERMATOLOGY

## 2023-01-25 PROCEDURE — 88342 IMHCHEM/IMCYTCHM 1ST ANTB: CPT | Mod: 26,,, | Performed by: DERMATOLOGY

## 2023-01-25 PROCEDURE — 88312 PR  SPECIAL STAINS,GROUP I: ICD-10-PCS | Mod: 26,,, | Performed by: DERMATOLOGY

## 2023-01-25 PROCEDURE — 11104 PUNCH BX SKIN SINGLE LESION: CPT | Mod: S$GLB,,, | Performed by: DERMATOLOGY

## 2023-01-25 PROCEDURE — 88342 CHG IMMUNOCYTOCHEMISTRY: ICD-10-PCS | Mod: 26,,, | Performed by: DERMATOLOGY

## 2023-01-25 PROCEDURE — 99999 PR PBB SHADOW E&M-EST. PATIENT-LVL III: ICD-10-PCS | Mod: PBBFAC,,, | Performed by: DERMATOLOGY

## 2023-01-25 RX ORDER — ERYTHROMYCIN 333 MG/1
333 TABLET, DELAYED RELEASE ORAL 3 TIMES DAILY
Status: CANCELLED | OUTPATIENT
Start: 2023-01-25

## 2023-01-25 NOTE — PATIENT INSTRUCTIONS

## 2023-02-06 LAB
COMMENT: NORMAL
FINAL PATHOLOGIC DIAGNOSIS: NORMAL
GROSS: NORMAL
Lab: NORMAL
MICROSCOPIC EXAM: NORMAL

## 2023-02-08 ENCOUNTER — PATIENT MESSAGE (OUTPATIENT)
Dept: DERMATOLOGY | Facility: CLINIC | Age: 23
End: 2023-02-08
Payer: COMMERCIAL

## 2023-02-15 ENCOUNTER — TELEPHONE (OUTPATIENT)
Dept: DERMATOLOGY | Facility: CLINIC | Age: 23
End: 2023-02-15

## 2023-02-15 ENCOUNTER — PATIENT MESSAGE (OUTPATIENT)
Dept: DERMATOLOGY | Facility: CLINIC | Age: 23
End: 2023-02-15

## 2023-02-15 ENCOUNTER — OFFICE VISIT (OUTPATIENT)
Dept: DERMATOLOGY | Facility: CLINIC | Age: 23
End: 2023-02-15
Payer: COMMERCIAL

## 2023-02-15 DIAGNOSIS — L73.8 EOSINOPHILIC FOLLICULITIS: Primary | ICD-10-CM

## 2023-02-15 DIAGNOSIS — D72.18 EOSINOPHILIC FOLLICULITIS: Primary | ICD-10-CM

## 2023-02-15 PROCEDURE — 99215 OFFICE O/P EST HI 40 MIN: CPT | Mod: 95,,, | Performed by: DERMATOLOGY

## 2023-02-15 PROCEDURE — 99215 PR OFFICE/OUTPT VISIT, EST, LEVL V, 40-54 MIN: ICD-10-PCS | Mod: 95,,, | Performed by: DERMATOLOGY

## 2023-02-15 RX ORDER — DAPSONE 75 MG/G
GEL TOPICAL
Qty: 60 G | Refills: 3 | Status: SHIPPED | OUTPATIENT
Start: 2023-02-15 | End: 2023-08-18 | Stop reason: ALTCHOICE

## 2023-02-15 NOTE — PATIENT INSTRUCTIONS
Plan:  - wash with hibclens  or benzyl peroxide 3 x a week to prevent super infection of staph  If lesions appear to have honey colored crust start clindamycin solution in addition to azone  - also can try changes (low in sugar/glycemic index such as paleo diet and a diet high in omega 3s )has shown to help diseases similar to this such as hidradenitis suppurativa  -apply dapsone to current lesions, is dapsone is not approved through insurance we will get through [place] [facility]  -f/u 6 mo

## 2023-02-15 NOTE — PROGRESS NOTES
Subjective:       Patient ID:  Anthony Lares is a 22 y.o. male who presents for No chief complaint on file.    Patient is here to discuss biopsy results previously seen in the past for folliculitis took over 8 months of antibiotics without relief.  The patient continues to get lesions despite taking the antibiotics and using topical antibiotics.  Has used topical corticosteroids without relief as well.  Reports lesions are still they are not itchy.  Mild to moderate in severity.  No associated symptoms.       Review of Systems   Constitutional:  Negative for fever, chills and fatigue.   Skin:  Negative for daily sunscreen use, recent sunburn and wears hat.   Hematologic/Lymphatic: Does not bruise/bleed easily.      Objective:    Physical Exam   Constitutional: He appears well-developed and well-nourished. No distress.   Neurological: He is alert and oriented to person, place, and time. He is not disoriented.   Psychiatric: He has a normal mood and affect.   Skin:   Areas Examined (abnormalities noted in diagram):   Chest / Axilla Inspection Performed  LUE Inspection Performed  RLE Inspected  LLE Inspection Performed                Diagram Legend     Erythematous scaling macule/papule c/w actinic keratosis       Vascular papule c/w angioma      Pigmented verrucoid papule/plaque c/w seborrheic keratosis      Yellow umbilicated papule c/w sebaceous hyperplasia      Irregularly shaped tan macule c/w lentigo     1-2 mm smooth white papules consistent with Milia      Movable subcutaneous cyst with punctum c/w epidermal inclusion cyst      Subcutaneous movable cyst c/w pilar cyst      Firm pink to brown papule c/w dermatofibroma      Pedunculated fleshy papule(s) c/w skin tag(s)      Evenly pigmented macule c/w junctional nevus     Mildly variegated pigmented, slightly irregular-bordered macule c/w mildly atypical nevus      Flesh colored to evenly pigmented papule c/w intradermal nevus       Pink pearly  papule/plaque c/w basal cell carcinoma      Erythematous hyperkeratotic cursted plaque c/w SCC      Surgical scar with no sign of skin cancer recurrence      Open and closed comedones      Inflammatory papules and pustules      Verrucoid papule consistent consistent with wart     Erythematous eczematous patches and plaques     Dystrophic onycholytic nail with subungual debris c/w onychomycosis     Umbilicated papule    Erythematous-base heme-crusted tan verrucoid plaque consistent with inflamed seborrheic keratosis     Erythematous Silvery Scaling Plaque c/w Psoriasis     See annotation     Final Pathologic Diagnosis   Date Value Ref Range Status   01/25/2023   Final    Skin, left posterior thigh, punch biopsy:  - SUPERFICIAL PERIVASCULAR TO MID DERMAL LYMPHOCYTIC INFILTRATE WITH  EOSINOPHILS  - RUPTURED FOLLICULITIS  Note: The lesions are consistent with a dermal hypersensitivity reaction. It  may be idiopathic or follow well-documented events such as drug ingestion;  vaccination; an arthropod or snake bite; and an infection, particularly of  viral type.  There is also concurrent folliculitis present.       Comment:     Interp By Susie Hercules M.D., Signed on 02/06/2023 at 13:51           Assessment / Plan:        Eosinophilic folliculitis  -     ACZONE 7.5 % GlwP; AAA face daily - bid  Dispense: 60 g; Refill: 3    -after discussion with other dermatologist in dermatopathologist come to the conclusion that patient likely has folliculitis or possibly eosinophils folliculitis due to the amount of eosinophils present within the biopsy reoccur in nature.  It is inflammatory in nature cultures have come back negative for staph in the past.  Patient previously biopsied which showed perivascular dermatitis at last visit it was decided to re-biopsy as there was a almost necrotic looking lesion to rule out PLEVA.  I personally reviewed his biopsy there are findings are not consistent with this and are consistent with a  folliculitis with more eosinophils the expected and likely a background dermal hypersensitivity reaction.  Several levels were cut to rule out this disease process as discussed with patient in visit.   - discussed that it usually occurs to an immune compromised state, patient will get tested for STDs next week and will let me know his HIV status, His HbA1C is 6.3 as of 7/2022 increased from 2021 of 6.1, discussed that pre diabetes likely has a role in immunocompromise state.  Went over patient's previous CBC which is in normal range from 7/2022.  Discussed a lot of different acne related orders are triggered by excess insulin like growth factor which can occur even in the prediabetic stage. Discussed with patient diet and exercise will help to lower the amount of insulin like growth factor, metformin can even be considered in has been using other similar diseases including hidradenitis suppurativa.  Patient at this time wishes to do dietary modifications he will have hemoglobin A1c checked again in July which I will go over again at that time.  At this time he wishes to treat with topical only I had discussed treating with oral dapsone and went over side effects including peripheral neuropathy,  low blood counts, hypersensitivity reaction and making sure his cardiovascular system was not checked at this time he wishes to with hold this medication. Patient discussed with me how taking the antibiotics for almost 8 months made his stomach upset as well as there were other issues he encountered and wishes to not take antibiotics for long periods of time.    Plan:  - wash with hibclens  or benzyl peroxide 3 x a week to prevent super infection of staph  If lesions appear to have honey colored crust start clindamycin solution in addition to azone  - also can try changes (low in sugar/glycemic index such as paleo diet and a diet high in omega 3s )has shown to help diseases similar to this such as hidradenitis  suppurativa  -apply dapsone to current lesions, is dapsone is not approved through insurance we will get through [place] [facility]  -f/u 6 mo      The patient location is: Home in [place]  The chief complaint leading to consultation is: rash  Visit type: audiovisual    Face to Face time with patient: 30  50 minutes of total time spent on the encounter, which includes face to face time and non-face to face time preparing to see the patient (eg, review of tests), Obtaining and/or reviewing separately obtained history, Documenting clinical information in the electronic or other health record, Independently interpreting results (not separately reported) and communicating results to the patient/family/caregiver, or Care coordination (not separately reported).         Each patient to whom he or she provides medical services by telemedicine is:  (1) informed of the relationship between the physician and patient and the respective role of any other health care provider with respect to management of the patient; and (2) notified that he or she may decline to receive medical services by telemedicine and may withdraw from such care at any time.             No follow-ups on file.

## 2023-02-20 PROBLEM — R73.01 IFG (IMPAIRED FASTING GLUCOSE): Status: ACTIVE | Noted: 2023-02-20

## 2023-02-20 PROBLEM — R73.03 PREDIABETES: Status: ACTIVE | Noted: 2023-02-20

## 2023-08-18 PROBLEM — D72.18 EOSINOPHILIC FOLLICULITIS: Status: ACTIVE | Noted: 2023-08-18

## 2023-08-18 PROBLEM — L73.9 CHRONIC FOLLICULITIS: Status: ACTIVE | Noted: 2023-08-18

## 2023-08-18 PROBLEM — L73.8 EOSINOPHILIC FOLLICULITIS: Status: ACTIVE | Noted: 2023-08-18

## 2023-08-18 PROCEDURE — 80074 ACUTE HEPATITIS PANEL: CPT | Performed by: FAMILY MEDICINE

## 2023-08-18 PROCEDURE — 87389 HIV-1 AG W/HIV-1&-2 AB AG IA: CPT | Performed by: FAMILY MEDICINE

## 2023-08-18 PROCEDURE — 87591 N.GONORRHOEAE DNA AMP PROB: CPT | Performed by: FAMILY MEDICINE

## 2023-08-18 PROCEDURE — 87491 CHLMYD TRACH DNA AMP PROBE: CPT | Performed by: FAMILY MEDICINE

## 2023-08-18 PROCEDURE — 86780 TREPONEMA PALLIDUM: CPT | Performed by: FAMILY MEDICINE

## 2023-11-06 ENCOUNTER — PATIENT MESSAGE (OUTPATIENT)
Dept: PSYCHIATRY | Facility: CLINIC | Age: 23
End: 2023-11-06
Payer: COMMERCIAL

## 2024-09-03 PROCEDURE — 86780 TREPONEMA PALLIDUM: CPT | Performed by: NURSE PRACTITIONER

## 2024-09-03 PROCEDURE — 87591 N.GONORRHOEAE DNA AMP PROB: CPT | Performed by: NURSE PRACTITIONER

## 2024-09-03 PROCEDURE — 87491 CHLMYD TRACH DNA AMP PROBE: CPT | Performed by: NURSE PRACTITIONER

## 2024-09-03 PROCEDURE — 87389 HIV-1 AG W/HIV-1&-2 AB AG IA: CPT | Performed by: NURSE PRACTITIONER

## 2024-09-13 PROBLEM — R73.03 PREDIABETES: Status: RESOLVED | Noted: 2023-02-20 | Resolved: 2024-09-13

## 2024-09-13 PROBLEM — R73.01 IFG (IMPAIRED FASTING GLUCOSE): Status: RESOLVED | Noted: 2023-02-20 | Resolved: 2024-09-13

## 2024-09-13 PROBLEM — E11.9 TYPE 2 DIABETES MELLITUS: Status: ACTIVE | Noted: 2024-09-13
